# Patient Record
Sex: MALE | Race: WHITE | Employment: FULL TIME | URBAN - METROPOLITAN AREA
[De-identification: names, ages, dates, MRNs, and addresses within clinical notes are randomized per-mention and may not be internally consistent; named-entity substitution may affect disease eponyms.]

---

## 2017-04-09 ENCOUNTER — HOSPITAL ENCOUNTER (OUTPATIENT)
Age: 32
Discharge: HOME | End: 2017-04-09
Attending: EMERGENCY MEDICINE
Payer: COMMERCIAL

## 2017-04-09 VITALS
SYSTOLIC BLOOD PRESSURE: 119 MMHG | BODY MASS INDEX: 36.4 KG/M2 | DIASTOLIC BLOOD PRESSURE: 78 MMHG | OXYGEN SATURATION: 97 % | HEIGHT: 71 IN | WEIGHT: 260 LBS | RESPIRATION RATE: 16 BRPM | HEART RATE: 83 BPM | TEMPERATURE: 98 F

## 2017-04-09 DIAGNOSIS — R23.3 PETECHIAE: Primary | ICD-10-CM

## 2017-04-09 PROCEDURE — 80053 COMPREHEN METABOLIC PANEL: CPT | Performed by: EMERGENCY MEDICINE

## 2017-04-09 PROCEDURE — 85025 COMPLETE CBC W/AUTO DIFF WBC: CPT | Performed by: EMERGENCY MEDICINE

## 2017-04-09 PROCEDURE — 99213 OFFICE O/P EST LOW 20 MIN: CPT

## 2017-04-09 PROCEDURE — 36415 COLL VENOUS BLD VENIPUNCTURE: CPT

## 2017-04-09 PROCEDURE — 80047 BASIC METABLC PNL IONIZED CA: CPT

## 2017-04-09 PROCEDURE — 81002 URINALYSIS NONAUTO W/O SCOPE: CPT | Performed by: EMERGENCY MEDICINE

## 2017-04-09 NOTE — ED NOTES
Patient states he was feeling light headed after blood draw. Patient was layed flat and given ice to apply to forehead. Patient speaking clearly in full sentences. No LOC. Patient given call light and instructed to push if he feels like he is worsening.

## 2017-04-09 NOTE — ED PROVIDER NOTES
Patient presents with:  Rash Skin Problem (integumentary)    HPI:     Darvin Epperson is a 27year old male who presents with chief complaint of rash. Started about 5-6 days ago. Started on the ankles and has spread caudally. No fevers.   No recent viral POCT CBC     MDM:     CMP sent. CBC, istat and UA wnl. Recommend f/u with Derm tomorrow as scheduled. Assessment/Plan:     Diagnosis:  Petechiae    Plan:  1. CMP pending  2.   F/u with derm as scheduled tomorrow    All results reviewed and discuss

## 2017-04-09 NOTE — ED NOTES
Patient will be calling to have someone fax results of CMP to his dermatologist. Patient has an appt tomorrow 4/10/17. Pt provided with all other labs done here today.

## 2017-04-09 NOTE — ED NOTES
Patient given juice and crackers. States he didn't eat much for breakfast. States he is feeling much better.

## 2017-04-09 NOTE — ED INITIAL ASSESSMENT (HPI)
Patient noticed rash to abd and bilateral lower extremities last Wednesday. Saw pcp Thursday and was told it should resolve on its own. Pt states last night he noticed it spread up his legs. Rash does not itch or burn.

## 2017-07-11 ENCOUNTER — TELEPHONE (OUTPATIENT)
Dept: INTERNAL MEDICINE CLINIC | Facility: CLINIC | Age: 32
End: 2017-07-11

## 2017-07-11 NOTE — TELEPHONE ENCOUNTER
Time started: 11:46am    Time ended: 11:47am    Total time spent on chart: 1 min    Patient has a NP appt at the [facility] on 8/2/17 , chart has been reviewed by RN.

## 2017-08-02 ENCOUNTER — OFFICE VISIT (OUTPATIENT)
Dept: INTERNAL MEDICINE CLINIC | Facility: CLINIC | Age: 32
End: 2017-08-02

## 2017-08-02 VITALS
WEIGHT: 272 LBS | RESPIRATION RATE: 16 BRPM | BODY MASS INDEX: 38.94 KG/M2 | SYSTOLIC BLOOD PRESSURE: 120 MMHG | HEART RATE: 80 BPM | HEIGHT: 70 IN | DIASTOLIC BLOOD PRESSURE: 80 MMHG

## 2017-08-02 DIAGNOSIS — F32.A ANXIETY AND DEPRESSION: ICD-10-CM

## 2017-08-02 DIAGNOSIS — F41.9 ANXIETY AND DEPRESSION: ICD-10-CM

## 2017-08-02 DIAGNOSIS — J45.30 MILD PERSISTENT ASTHMA WITHOUT COMPLICATION: ICD-10-CM

## 2017-08-02 DIAGNOSIS — Z51.81 THERAPEUTIC DRUG MONITORING: Primary | ICD-10-CM

## 2017-08-02 PROCEDURE — 93000 ELECTROCARDIOGRAM COMPLETE: CPT | Performed by: NURSE PRACTITIONER

## 2017-08-02 PROCEDURE — 99203 OFFICE O/P NEW LOW 30 MIN: CPT | Performed by: NURSE PRACTITIONER

## 2017-08-02 RX ORDER — PHENTERMINE HYDROCHLORIDE 37.5 MG/1
37.5 TABLET ORAL
Qty: 30 TABLET | Refills: 0 | Status: SHIPPED | OUTPATIENT
Start: 2017-08-02 | End: 2017-11-27

## 2017-08-02 RX ORDER — EPINEPHRINE 0.3 MG/.3ML
INJECTION SUBCUTANEOUS
Refills: 99 | COMMUNITY
Start: 2017-07-07 | End: 2018-12-27

## 2017-08-02 NOTE — PROGRESS NOTES
Cecil Menjivar is a 34year old male new to our office today. Referred by wife, new patient at [facility]. S:  Patient presents today with spouse for c/o weight gain in the past 4-5 years.   Patient sees excess weight as having a mild effect on health and quali icteric, PERRLA  HEENT: atraumatic, normocephalic, O/p: Mallampati score- 3  NECK: supple, non tender, no adenopathy, no thyromegaly  LUNGS: CTA in all fields, breathing non labored  CARDIO: RRR without murmur, normal S1 and S2 without clicks or gallops, n Welcome to the [facility]. ..your Lifestyle Renovation begins now! Thank you for placing your trust in our health care team, I look forward to working with you along this journey to better health!     Next steps:     1.  Schedule a personal another comparable alternative can be done in your home or place of work with little time commitment. Don't forget to schedule your 1 month follow-up visit! Medication use and SEs reviewed with patient.     Return in about 1 month (around 9/2/20

## 2017-08-02 NOTE — PATIENT INSTRUCTIONS
Welcome to the [facility]. ..your Lifestyle Renovation begins now! Thank you for placing your trust in our health care team, I look forward to working with you along this journey to better health!     Next steps:     1.  Schedule a personal n comparable alternative can be done in your home or place of work with little time commitment. Don't forget to schedule your 1 month follow-up visit!

## 2017-08-08 ENCOUNTER — APPOINTMENT (OUTPATIENT)
Dept: LAB | Age: 32
End: 2017-08-08
Attending: NURSE PRACTITIONER
Payer: COMMERCIAL

## 2017-08-08 DIAGNOSIS — F41.9 ANXIETY AND DEPRESSION: ICD-10-CM

## 2017-08-08 DIAGNOSIS — F32.A ANXIETY AND DEPRESSION: ICD-10-CM

## 2017-08-08 LAB
25-HYDROXYVITAMIN D (TOTAL): 27.4 NG/ML (ref 30–100)
EST. AVERAGE GLUCOSE BLD GHB EST-MCNC: 103 MG/DL (ref 68–126)
HAV AB SERPL IA-ACNC: 461 PG/ML (ref 193–986)
HBA1C MFR BLD HPLC: 5.2 % (ref ?–5.7)
TSI SER-ACNC: 0.89 MIU/ML (ref 0.35–5.5)

## 2017-08-08 PROCEDURE — 82607 VITAMIN B-12: CPT

## 2017-08-08 PROCEDURE — 82306 VITAMIN D 25 HYDROXY: CPT

## 2017-08-08 PROCEDURE — 84443 ASSAY THYROID STIM HORMONE: CPT

## 2017-08-08 PROCEDURE — 82397 CHEMILUMINESCENT ASSAY: CPT

## 2017-08-08 PROCEDURE — 36415 COLL VENOUS BLD VENIPUNCTURE: CPT

## 2017-08-08 PROCEDURE — 83036 HEMOGLOBIN GLYCOSYLATED A1C: CPT

## 2017-08-10 LAB — LEPTIN: 20.2 NG/ML

## 2017-08-17 ENCOUNTER — OFFICE VISIT (OUTPATIENT)
Dept: INTERNAL MEDICINE CLINIC | Facility: CLINIC | Age: 32
End: 2017-08-17

## 2017-08-17 DIAGNOSIS — E66.9 OBESITY, CLASS II, BMI 35-39.9: ICD-10-CM

## 2017-08-17 PROCEDURE — 97802 MEDICAL NUTRITION INDIV IN: CPT | Performed by: DIETITIAN, REGISTERED

## 2017-08-20 VITALS — BODY MASS INDEX: 39 KG/M2 | WEIGHT: 270 LBS

## 2017-08-20 NOTE — PROGRESS NOTES
INITIAL OUTPATIENT NUTRITION CONSULTATION    Nutrition Assessment    Medical Diagnosis: Obesity    Client Hx: 34year old male, , FT     Problem List as of 8/17/2017 Reviewed: 8/2/2017  2:27 PM by CLEMENCIA Dela Cruz       Re week    Diet/Weight History: Max weight of 272 pounds at first appt. Not overweight as a child. Wt has increased in the past 5 years since marriage.   In 2010 lost 30 pounds (220-190 pounds) exercising with a  and eating using [employer] busy.  Encouraged eating 3 meals daily and provided sample options. Although pt may have wt. Loss success from reducing portions of foods it is difficult to improve quality of diet due to limited food options and aversion to incorporating wider variety.

## 2017-09-23 ENCOUNTER — OFFICE VISIT (OUTPATIENT)
Dept: INTERNAL MEDICINE CLINIC | Facility: CLINIC | Age: 32
End: 2017-09-23

## 2017-09-23 VITALS
SYSTOLIC BLOOD PRESSURE: 122 MMHG | HEART RATE: 80 BPM | RESPIRATION RATE: 16 BRPM | BODY MASS INDEX: 35.93 KG/M2 | WEIGHT: 251 LBS | DIASTOLIC BLOOD PRESSURE: 72 MMHG | HEIGHT: 70 IN

## 2017-09-23 DIAGNOSIS — F32.A DEPRESSION, CONTROLLED: ICD-10-CM

## 2017-09-23 DIAGNOSIS — Z51.81 ENCOUNTER FOR THERAPEUTIC DRUG MONITORING: Primary | ICD-10-CM

## 2017-09-23 DIAGNOSIS — Z51.81 THERAPEUTIC DRUG MONITORING: ICD-10-CM

## 2017-09-23 DIAGNOSIS — E55.9 VITAMIN D DEFICIENCY: ICD-10-CM

## 2017-09-23 PROCEDURE — 99214 OFFICE O/P EST MOD 30 MIN: CPT | Performed by: NURSE PRACTITIONER

## 2017-09-23 RX ORDER — PHENTERMINE HYDROCHLORIDE 37.5 MG/1
37.5 TABLET ORAL
Qty: 30 TABLET | Refills: 0 | Status: CANCELLED | OUTPATIENT
Start: 2017-09-23

## 2017-09-23 RX ORDER — MOMETASONE FUROATE AND FORMOTEROL FUMARATE DIHYDRATE 200; 5 UG/1; UG/1
2 AEROSOL RESPIRATORY (INHALATION) 2 TIMES DAILY
Refills: 1 | COMMUNITY
Start: 2017-09-12 | End: 2021-02-02

## 2017-09-23 RX ORDER — ERGOCALCIFEROL 1.25 MG/1
50000 CAPSULE ORAL WEEKLY
Qty: 4 CAPSULE | Refills: 5 | Status: SHIPPED | OUTPATIENT
Start: 2017-09-23 | End: 2018-03-01 | Stop reason: ALTCHOICE

## 2017-09-23 NOTE — PATIENT INSTRUCTIONS
Congratulations on your 21# weight loss! Continue making lifestyle changes that focus on good nutrition, regular exercise and stress management.   Today we reviewed your labs with findings of: elevated leptin, low vitamin d    Medication Plan: Start phenter Of course it takes a few weeks before you see any measurable changes. But you’ll start to build muscle, lose fat and burn more calories from the moment you begin weight training. Building muscle helps you:  1. Burn more calories.  Unlike fat, muscle beefs u

## 2017-09-23 NOTE — PROGRESS NOTES
Kameron Morrow is a 34year old male presents today for 2 month follow-up on medical weight loss program for the treatment of overweight, obesity, or morbid obesity with associated elevated leptin, low vitamin D.    S:  Current weight Wt Readings from Last PSYCH: pleasant, cooperative, normal mood and affect    ASSESSMENT AND PLAN:  Encounter for therapeutic drug monitoring  (primary encounter diagnosis)  Therapeutic drug monitoring  Bmi 39.0-39.9,adult  Vitamin d deficiency  Depression, controlled    No ord Maybe Sanjiv Shaikh wondered about muscle vs fat and why you need to build muscle to lose fat, look slim and keep the inches off. Well, look no further!  With the fat vs muscle diagram below you’ll see why healthy permanent weight loss requires you to build muscl 4. Prevent injury. Strength training can build stronger muscles and more limber, flexible joints, which play a crucial role in preventing injury. 5. Increase bone density. Weight bearing activities improve your bone density and reduce bone loss.  This help

## 2017-11-01 ENCOUNTER — OFFICE VISIT (OUTPATIENT)
Dept: INTERNAL MEDICINE CLINIC | Facility: CLINIC | Age: 32
End: 2017-11-01

## 2017-11-01 VITALS
RESPIRATION RATE: 16 BRPM | BODY MASS INDEX: 34.36 KG/M2 | HEART RATE: 78 BPM | SYSTOLIC BLOOD PRESSURE: 110 MMHG | DIASTOLIC BLOOD PRESSURE: 70 MMHG | HEIGHT: 70 IN | WEIGHT: 240 LBS

## 2017-11-01 DIAGNOSIS — E66.01 SEVERE OBESITY (BMI 35.0-39.9): ICD-10-CM

## 2017-11-01 DIAGNOSIS — Z51.81 THERAPEUTIC DRUG MONITORING: Primary | ICD-10-CM

## 2017-11-01 PROCEDURE — 99213 OFFICE O/P EST LOW 20 MIN: CPT | Performed by: NURSE PRACTITIONER

## 2017-11-01 RX ORDER — PHENTERMINE HYDROCHLORIDE 37.5 MG/1
37.5 TABLET ORAL
Qty: 30 TABLET | Refills: 0 | Status: CANCELLED | OUTPATIENT
Start: 2017-11-01

## 2017-11-01 NOTE — PROGRESS NOTES
Gosia Erickson is a 34year old male presents today for 3 month follow-up on medical weight loss program for the treatment of overweight, obesity, or morbid obesity with associated elevated leptin, low vitamin D.    S:  Current weight Wt Readings from Last Visit:  No prescriptions requested or ordered in this encounter    Imaging & Consults:  None      Plan:  Patient has lost 11# since LOV 1 month ago on continued lifestyle with a total weight loss of 32# since initial consult on 8/2/2017 with initial weight Scale Work? Before you eat, take a moment to rate your hunger. Think about how hungry you physically feel. Your goal is to eat between levels 4 and 6. This means you are eating when you are hungry but stopping when you are comfortably full.   Try not to pu verbalizes understanding.

## 2017-11-01 NOTE — PATIENT INSTRUCTIONS
Congratulations on your 11# weight loss! Continue making lifestyle changes that focus on good nutrition, regular exercise and stress management.     Medication Plan: Recommend starting 1/2 tab of phentermine daily in AM should evening cravings persist.    A to an emotion or because you are experiencing physical hunger? Think of alternatives to eating for when these temptations arise.  Some ideas are:  Drink a glass of cold water or another zero-calorie beverage   Take a walk to change the scenery   Do another

## 2017-11-29 ENCOUNTER — OFFICE VISIT (OUTPATIENT)
Dept: INTERNAL MEDICINE CLINIC | Facility: CLINIC | Age: 32
End: 2017-11-29

## 2017-11-29 VITALS
HEART RATE: 80 BPM | DIASTOLIC BLOOD PRESSURE: 68 MMHG | BODY MASS INDEX: 33.64 KG/M2 | HEIGHT: 70 IN | RESPIRATION RATE: 16 BRPM | SYSTOLIC BLOOD PRESSURE: 106 MMHG | WEIGHT: 235 LBS

## 2017-11-29 DIAGNOSIS — F32.A DEPRESSION, UNSPECIFIED DEPRESSION TYPE: ICD-10-CM

## 2017-11-29 DIAGNOSIS — Z51.81 THERAPEUTIC DRUG MONITORING: Primary | ICD-10-CM

## 2017-11-29 PROCEDURE — 99213 OFFICE O/P EST LOW 20 MIN: CPT | Performed by: NURSE PRACTITIONER

## 2017-11-29 RX ORDER — BUPROPION HYDROCHLORIDE 150 MG/1
150 TABLET ORAL DAILY
Qty: 30 TABLET | Refills: 1 | Status: SHIPPED | OUTPATIENT
Start: 2017-11-29 | End: 2018-01-02

## 2017-11-29 RX ORDER — PHENTERMINE HYDROCHLORIDE 37.5 MG/1
37.5 TABLET ORAL
Qty: 30 TABLET | Refills: 0 | Status: SHIPPED | OUTPATIENT
Start: 2017-11-29 | End: 2018-01-10

## 2017-11-29 RX ORDER — PHENTERMINE HYDROCHLORIDE 37.5 MG/1
37.5 TABLET ORAL
COMMUNITY
End: 2017-12-03

## 2017-11-29 NOTE — PROGRESS NOTES
Bárbara Malone is a 34year old male presents today for 4 month follow-up on medical weight loss program for the treatment of overweight, obesity, or morbid obesity with associated elevated leptin, low vitamin D.    S:  Current weight Wt Readings from Last PLAN:  Therapeutic drug monitoring  (primary encounter diagnosis)  Bmi 39.0-39.9,adult  Depression, unspecified depression type    No orders of the defined types were placed in this encounter.       Meds & Refills for this Visit:    Signed Prescriptions Dis

## 2017-11-29 NOTE — PATIENT INSTRUCTIONS
Congratulations on your 5# weight loss! Continue making lifestyle changes that focus on good nutrition, regular exercise and stress management. Medication Plan: Continue phentermine at current dose.  Change Wellbutrin to Wellbutrin  daily in AM.

## 2017-12-30 ENCOUNTER — PATIENT MESSAGE (OUTPATIENT)
Dept: INTERNAL MEDICINE CLINIC | Facility: CLINIC | Age: 32
End: 2017-12-30

## 2017-12-30 DIAGNOSIS — Z51.81 THERAPEUTIC DRUG MONITORING: ICD-10-CM

## 2017-12-30 DIAGNOSIS — F32.A DEPRESSION, UNSPECIFIED DEPRESSION TYPE: ICD-10-CM

## 2018-01-02 RX ORDER — BUPROPION HYDROCHLORIDE 150 MG/1
300 TABLET ORAL DAILY
Qty: 60 TABLET | Refills: 1 | Status: SHIPPED | OUTPATIENT
Start: 2018-01-02 | End: 2018-02-15

## 2018-01-02 NOTE — TELEPHONE ENCOUNTER
First confirm no suicidal ideation, if no then please notify patient that I increased the dose to requested dose of Wellbutrin  mg tab, si tabs (300 mg) daily.  If mood does not improve over the next week or worsening he needs to see his PCP/couns

## 2018-01-02 NOTE — TELEPHONE ENCOUNTER
From: Caryn Wang  To: CLEMENCIA Trinh  Sent: 12/30/2017 6:43 PM CST  Subject: Prescription Question    Dennis Marion,  I was doing pretty well on the 150mg Welbutrin XL but have been in a depression for the last 2 days now.  I'm wondering if I can i

## 2018-01-02 NOTE — TELEPHONE ENCOUNTER
Called patient to schedule an appointment for his December appointment as last ov was 11/29. Patient was scheduled for 1/10. I spoke with him about the  Message Gabe Yun put in regarding his Wellbutrin. He is not having any suicidal ideation.  He states he

## 2018-01-10 ENCOUNTER — OFFICE VISIT (OUTPATIENT)
Dept: INTERNAL MEDICINE CLINIC | Facility: CLINIC | Age: 33
End: 2018-01-10

## 2018-01-10 VITALS
HEART RATE: 68 BPM | RESPIRATION RATE: 16 BRPM | SYSTOLIC BLOOD PRESSURE: 110 MMHG | WEIGHT: 232 LBS | HEIGHT: 70 IN | BODY MASS INDEX: 33.21 KG/M2 | DIASTOLIC BLOOD PRESSURE: 70 MMHG

## 2018-01-10 DIAGNOSIS — F32.A DEPRESSION, CONTROLLED: ICD-10-CM

## 2018-01-10 DIAGNOSIS — E66.9 CLASS 2 OBESITY WITH BODY MASS INDEX (BMI) OF 38.0 TO 38.9 IN ADULT, UNSPECIFIED OBESITY TYPE, UNSPECIFIED WHETHER SERIOUS COMORBIDITY PRESENT: ICD-10-CM

## 2018-01-10 DIAGNOSIS — Z51.81 THERAPEUTIC DRUG MONITORING: Primary | ICD-10-CM

## 2018-01-10 PROCEDURE — 99213 OFFICE O/P EST LOW 20 MIN: CPT | Performed by: NURSE PRACTITIONER

## 2018-01-10 RX ORDER — PHENTERMINE HYDROCHLORIDE 37.5 MG/1
37.5 TABLET ORAL
Qty: 30 TABLET | Refills: 0 | Status: SHIPPED | OUTPATIENT
Start: 2018-01-10 | End: 2018-02-15 | Stop reason: DRUGHIGH

## 2018-01-10 NOTE — PATIENT INSTRUCTIONS
Congratulations on your 3# weight loss! Continue making lifestyle changes that focus on good nutrition, regular exercise and stress management. Medication Plan: Continue phentermine at current dose. Increase Wellbutrin XL to 300 mg daily.     Agreed upon you don’t meet a goal, don’t use it as an excuse to give up on your whole plan. Adjust your goal and try again. · Try to understand your own attitude about food.  Are you subject to emotional eating? · Learn how to accept compliments.  Even if you get emb

## 2018-01-10 NOTE — PROGRESS NOTES
Caryn Wang is a 34year old male presents today for 5 month follow-up on medical weight loss program for the treatment of overweight, obesity, or morbid obesity with associated elevated leptin, low vitamin D.    S:  Current weight Wt Readings from Last with body mass index (bmi) of 38.0 to 38.9 in adult, unspecified obesity type, unspecified whether serious comorbidity present  Depression, controlled    No orders of the defined types were placed in this encounter.       Meds & Refills for this Visit:    S time of day. Weighing yourself each day will probably only frustrate you.    Stay motivated  Here are suggestions to keep you motivated:   · Remind yourself of your goals. Post them near the refrigerator or desk where you work.   · Make daily entries in you not intended as a substitute for professional medical care. Always follow your healthcare professional's instructions. Medication use and SEs reviewed with patient. Return in about 1 month (around 2/10/2018) for weight mangement.     Patient Katia Carey

## 2018-01-21 DIAGNOSIS — Z51.81 THERAPEUTIC DRUG MONITORING: ICD-10-CM

## 2018-01-21 DIAGNOSIS — F32.A DEPRESSION, UNSPECIFIED DEPRESSION TYPE: ICD-10-CM

## 2018-01-22 RX ORDER — BUPROPION HYDROCHLORIDE 150 MG/1
150 TABLET ORAL DAILY
Qty: 30 TABLET | Refills: 1 | OUTPATIENT
Start: 2018-01-22

## 2018-01-22 NOTE — TELEPHONE ENCOUNTER
[facility] requesting: bupropion ER XL 150mg 1 QD  LOV: 1/10/18 [facility]  Continue phentermine at current dose. Increase Wellbutrin XL to 300 mg daily as previously prescribed. RTC: 1 month  Last Relevant Labs: n/a  Filled: 1/2/18 #60 with 1 refills.   Take 2

## 2018-02-15 ENCOUNTER — OFFICE VISIT (OUTPATIENT)
Dept: INTERNAL MEDICINE CLINIC | Facility: CLINIC | Age: 33
End: 2018-02-15

## 2018-02-15 VITALS
HEIGHT: 70 IN | DIASTOLIC BLOOD PRESSURE: 88 MMHG | BODY MASS INDEX: 32.78 KG/M2 | SYSTOLIC BLOOD PRESSURE: 120 MMHG | HEART RATE: 80 BPM | WEIGHT: 229 LBS

## 2018-02-15 DIAGNOSIS — Z51.81 THERAPEUTIC DRUG MONITORING: Primary | ICD-10-CM

## 2018-02-15 DIAGNOSIS — F32.A DEPRESSION, UNSPECIFIED DEPRESSION TYPE: ICD-10-CM

## 2018-02-15 DIAGNOSIS — E66.9 CLASS 2 OBESITY WITH BODY MASS INDEX (BMI) OF 38.0 TO 38.9 IN ADULT, UNSPECIFIED OBESITY TYPE, UNSPECIFIED WHETHER SERIOUS COMORBIDITY PRESENT: ICD-10-CM

## 2018-02-15 PROCEDURE — 99213 OFFICE O/P EST LOW 20 MIN: CPT | Performed by: NURSE PRACTITIONER

## 2018-02-15 RX ORDER — BUPROPION HYDROCHLORIDE 150 MG/1
300 TABLET ORAL DAILY
Qty: 60 TABLET | Refills: 5 | Status: SHIPPED | OUTPATIENT
Start: 2018-02-15 | End: 2018-07-17

## 2018-02-15 RX ORDER — PHENTERMINE HYDROCHLORIDE 15 MG/1
15 CAPSULE ORAL EVERY MORNING
Qty: 30 CAPSULE | Refills: 1 | Status: SHIPPED | OUTPATIENT
Start: 2018-02-15 | End: 2018-05-09

## 2018-02-15 RX ORDER — PHENTERMINE HYDROCHLORIDE 37.5 MG/1
37.5 TABLET ORAL
Qty: 30 TABLET | Refills: 0 | Status: CANCELLED | OUTPATIENT
Start: 2018-02-15

## 2018-02-15 RX ORDER — METFORMIN HYDROCHLORIDE 500 MG/1
500 TABLET, EXTENDED RELEASE ORAL
Qty: 30 TABLET | Refills: 1 | Status: SHIPPED | OUTPATIENT
Start: 2018-02-15 | End: 2018-06-05

## 2018-02-15 NOTE — PROGRESS NOTES
Aileen Ashley is a 34year old male presents today for 6 month follow-up on medical weight loss program for the treatment of overweight, obesity, or morbid obesity with associated elevated leptin, low vitamin D.    S:  Current weight Wt Readings from Last 38.9 in adult, unspecified obesity type, unspecified whether serious comorbidity present  Depression, unspecified depression type    No orders of the defined types were placed in this encounter.       Meds & Refills for this Visit:    Signed Prescriptions D

## 2018-02-15 NOTE — PATIENT INSTRUCTIONS
Congratulations on your 3# weight loss! Continue making lifestyle changes that focus on good nutrition, regular exercise and stress management. Medication Plan: Reduce phentermine to 15 mg daily, continue Wellbutrin XL at current dose.  Add Metformin ER

## 2018-03-02 DIAGNOSIS — F32.A DEPRESSION, UNSPECIFIED DEPRESSION TYPE: ICD-10-CM

## 2018-03-02 DIAGNOSIS — Z51.81 THERAPEUTIC DRUG MONITORING: ICD-10-CM

## 2018-03-02 DIAGNOSIS — E66.9 CLASS 2 OBESITY WITH BODY MASS INDEX (BMI) OF 38.0 TO 38.9 IN ADULT, UNSPECIFIED OBESITY TYPE, UNSPECIFIED WHETHER SERIOUS COMORBIDITY PRESENT: ICD-10-CM

## 2018-03-03 RX ORDER — BUPROPION HYDROCHLORIDE 150 MG/1
TABLET ORAL
Qty: 180 TABLET | Refills: 1 | OUTPATIENT
Start: 2018-03-03

## 2018-03-07 ENCOUNTER — OFFICE VISIT (OUTPATIENT)
Dept: INTERNAL MEDICINE CLINIC | Facility: CLINIC | Age: 33
End: 2018-03-07

## 2018-03-07 VITALS
RESPIRATION RATE: 16 BRPM | DIASTOLIC BLOOD PRESSURE: 70 MMHG | HEIGHT: 70 IN | HEART RATE: 80 BPM | WEIGHT: 227 LBS | BODY MASS INDEX: 32.5 KG/M2 | SYSTOLIC BLOOD PRESSURE: 100 MMHG

## 2018-03-07 DIAGNOSIS — Z51.81 THERAPEUTIC DRUG MONITORING: Primary | ICD-10-CM

## 2018-03-07 DIAGNOSIS — E66.9 CLASS 2 OBESITY WITH BODY MASS INDEX (BMI) OF 38.0 TO 38.9 IN ADULT, UNSPECIFIED OBESITY TYPE, UNSPECIFIED WHETHER SERIOUS COMORBIDITY PRESENT: ICD-10-CM

## 2018-03-07 PROCEDURE — 99213 OFFICE O/P EST LOW 20 MIN: CPT | Performed by: NURSE PRACTITIONER

## 2018-03-07 RX ORDER — METFORMIN HYDROCHLORIDE 500 MG/1
500 TABLET, EXTENDED RELEASE ORAL
Qty: 30 TABLET | Refills: 1 | Status: CANCELLED | OUTPATIENT
Start: 2018-03-07

## 2018-03-07 NOTE — PROGRESS NOTES
Narendra Swanson is a 34year old male presents today with spouse for 7 month follow-up on medical weight loss program for the treatment of overweight, obesity, or morbid obesity with associated elevated leptin, low vitamin D.    S:  Current weight Wt Readin Class 2 obesity with body mass index (bmi) of 38.0 to 38.9 in adult, unspecified obesity type, unspecified whether serious comorbidity present    No orders of the defined types were placed in this encounter.       Meds & Refills for this Visit:    No prescr Weight Management: Overcoming Your Barriers    You may have many reasons why you’re not ready to lose weight. You may not feel you have the time or the skills. You may be afraid of losing weight and gaining it back again. Well, you can lose weight.  And you Do you have a health problem? If so, don’t use it as an excuse for not losing weight. Ask your healthcare provider or dietitian about methods to lose weight that are safe for you.  For example, even if you have severe arthritis, it may be easier for you to

## 2018-03-07 NOTE — PATIENT INSTRUCTIONS
Congratulations on your 2# weight loss! Continue making lifestyle changes that focus on good nutrition, regular exercise and stress management.     Medication Plan: Continue Wellbutrin XL, okay to take phentermine around noon- if experience difficulty sleep schedule. · Borrow some time that you usually spend watching TV.   · You are too important not to take time to exercise—it is your life!   Feel good about yourself  Do you eat more because you feel bad about yourself, then feel even worse as you gain weigh

## 2018-05-09 ENCOUNTER — OFFICE VISIT (OUTPATIENT)
Dept: INTERNAL MEDICINE CLINIC | Facility: CLINIC | Age: 33
End: 2018-05-09

## 2018-05-09 VITALS
WEIGHT: 230 LBS | DIASTOLIC BLOOD PRESSURE: 60 MMHG | SYSTOLIC BLOOD PRESSURE: 110 MMHG | HEIGHT: 70 IN | OXYGEN SATURATION: 99 % | HEART RATE: 78 BPM | BODY MASS INDEX: 32.93 KG/M2 | RESPIRATION RATE: 16 BRPM

## 2018-05-09 DIAGNOSIS — Z51.81 THERAPEUTIC DRUG MONITORING: Primary | ICD-10-CM

## 2018-05-09 DIAGNOSIS — F32.A DEPRESSION, CONTROLLED: ICD-10-CM

## 2018-05-09 DIAGNOSIS — E66.01 SEVERE OBESITY (BMI 35.0-39.9): ICD-10-CM

## 2018-05-09 PROCEDURE — 99213 OFFICE O/P EST LOW 20 MIN: CPT | Performed by: NURSE PRACTITIONER

## 2018-05-09 RX ORDER — PHENTERMINE HYDROCHLORIDE 37.5 MG/1
37.5 CAPSULE ORAL EVERY MORNING
Qty: 30 CAPSULE | Refills: 0 | Status: SHIPPED | OUTPATIENT
Start: 2018-05-09 | End: 2018-06-05

## 2018-05-09 NOTE — PATIENT INSTRUCTIONS
Continue making lifestyle changes that focus on good nutrition, regular exercise and stress management.     Medication Plan: Continue current medication regimen, restart phentermine at 1/2 tab daily in AM for 1 week, then increase to a full tab daily as pre

## 2018-05-09 NOTE — PROGRESS NOTES
Caryn Wang is a 34year old male presents today with spouse for 9 month follow-up on medical weight loss program for the treatment of overweight, obesity, or morbid obesity with associated elevated leptin, low vitamin D.    S:  Current weight Wt Readin PLAN:  Therapeutic drug monitoring  (primary encounter diagnosis)  Severe obesity (bmi 35.0-39.9) (hcc)  Depression, controlled    No orders of the defined types were placed in this encounter.       Meds & Refills for this Visit:    Signed Prescriptions Dis with patient. Return in about 4 weeks (around 6/6/2018) for weight management. Patient verbalizes understanding.

## 2018-06-04 RX ORDER — PHENTERMINE HYDROCHLORIDE 37.5 MG/1
TABLET ORAL
Qty: 30 TABLET | Refills: 0 | OUTPATIENT
Start: 2018-06-04

## 2018-06-04 NOTE — TELEPHONE ENCOUNTER
Requesting Phentermine HCl 37.5 MG Oral Cap  LOV: 5/9/18  RTC: 1 mth   Last Relevant Labs:   Filled: 5/9/18 #30 with 0 refills    Future Appointments  Date Time Provider Michael Salazar   6/5/2018 2:40 PM CLEMENCIA Gonzalez [facility]     rx

## 2018-06-05 ENCOUNTER — OFFICE VISIT (OUTPATIENT)
Dept: INTERNAL MEDICINE CLINIC | Facility: CLINIC | Age: 33
End: 2018-06-05

## 2018-06-05 VITALS
RESPIRATION RATE: 16 BRPM | HEART RATE: 78 BPM | HEIGHT: 70 IN | WEIGHT: 231 LBS | DIASTOLIC BLOOD PRESSURE: 78 MMHG | SYSTOLIC BLOOD PRESSURE: 128 MMHG | BODY MASS INDEX: 33.07 KG/M2

## 2018-06-05 DIAGNOSIS — F32.A DEPRESSION, CONTROLLED: ICD-10-CM

## 2018-06-05 DIAGNOSIS — Z51.81 THERAPEUTIC DRUG MONITORING: Primary | ICD-10-CM

## 2018-06-05 DIAGNOSIS — E66.01 SEVERE OBESITY (BMI 35.0-39.9): ICD-10-CM

## 2018-06-05 PROCEDURE — 99213 OFFICE O/P EST LOW 20 MIN: CPT | Performed by: NURSE PRACTITIONER

## 2018-06-05 RX ORDER — METFORMIN HYDROCHLORIDE 500 MG/1
500 TABLET, EXTENDED RELEASE ORAL
Qty: 30 TABLET | Refills: 1 | Status: SHIPPED | OUTPATIENT
Start: 2018-06-05 | End: 2018-12-27

## 2018-06-05 RX ORDER — PHENTERMINE HYDROCHLORIDE 37.5 MG/1
37.5 CAPSULE ORAL EVERY MORNING
Qty: 30 CAPSULE | Refills: 0 | Status: SHIPPED | OUTPATIENT
Start: 2018-06-05 | End: 2018-09-06

## 2018-06-05 NOTE — PATIENT INSTRUCTIONS
Continue making lifestyle changes that focus on good nutrition, regular exercise and stress management.     Medication Plan: Restart phentermine: Start phentermine at 1/2 tab daily in AM before breakfast for 1 week, then increase to a full tab daily as pres eating. 1. Make a commitment. Like any established bad habit, nothing will change unless you make a commit to changing your behavior. 2. Practice awareness.  To be more conscious of what’s happening, jot down when and what you eat and how you feel before new health-oriented friends or join a support group. Learning how to stop emotional eating and overeating is a life-changing experience. Just make sure to stay on track and enjoy the journey.     Posted By Winter Belcher On December 27, 2015 @ 11:33 am In [place]

## 2018-06-05 NOTE — PROGRESS NOTES
Augustina Mcginnis is a 34year old male presents today with spouse for 10 month follow-up on medical weight loss program for the treatment of overweight, obesity, or morbid obesity with associated elevated leptin, low vitamin D.    S:  Current weight Wt Readi edema.  NEURO/MS: motor and sensory grossly intact  PSYCH: pleasant, cooperative, normal mood and affect    ASSESSMENT AND PLAN:  Therapeutic drug monitoring  (primary encounter diagnosis)  Severe obesity (bmi 35.0-39.9) (hcc)  Depression, controlled    No eliminating processed items.   5. Listen to CALM antoni Masterclass: 4 Pillars of Health and Rethinking Depression    Emotional Eating and Overeating   You have to know how to stop emotional eating and stop overeating if you want to lose weight and keep it off mood enhancer too. So be sure you make time for regular physical activity. 5. Create new comforts. Make a list of healthy activities you enjoy. And, whenever you feel the need, treat yourself to something on your list.  6. Start eating healthier.  When you

## 2018-07-17 ENCOUNTER — OFFICE VISIT (OUTPATIENT)
Dept: INTERNAL MEDICINE CLINIC | Facility: CLINIC | Age: 33
End: 2018-07-17
Payer: COMMERCIAL

## 2018-07-17 VITALS
HEIGHT: 70 IN | DIASTOLIC BLOOD PRESSURE: 80 MMHG | HEART RATE: 66 BPM | SYSTOLIC BLOOD PRESSURE: 100 MMHG | BODY MASS INDEX: 33.21 KG/M2 | RESPIRATION RATE: 16 BRPM | WEIGHT: 232 LBS

## 2018-07-17 DIAGNOSIS — E66.9 CLASS 2 OBESITY WITH BODY MASS INDEX (BMI) OF 38.0 TO 38.9 IN ADULT, UNSPECIFIED OBESITY TYPE, UNSPECIFIED WHETHER SERIOUS COMORBIDITY PRESENT: ICD-10-CM

## 2018-07-17 DIAGNOSIS — F32.A DEPRESSION, UNSPECIFIED DEPRESSION TYPE: ICD-10-CM

## 2018-07-17 DIAGNOSIS — Z51.81 THERAPEUTIC DRUG MONITORING: Primary | ICD-10-CM

## 2018-07-17 PROCEDURE — 99213 OFFICE O/P EST LOW 20 MIN: CPT | Performed by: NURSE PRACTITIONER

## 2018-07-17 RX ORDER — PHENTERMINE HYDROCHLORIDE 37.5 MG/1
37.5 CAPSULE ORAL EVERY MORNING
Qty: 30 CAPSULE | Refills: 0 | Status: CANCELLED | OUTPATIENT
Start: 2018-07-17

## 2018-07-17 RX ORDER — METFORMIN HYDROCHLORIDE 500 MG/1
500 TABLET, EXTENDED RELEASE ORAL
Qty: 30 TABLET | Refills: 1 | Status: CANCELLED | OUTPATIENT
Start: 2018-07-17

## 2018-07-17 RX ORDER — BUPROPION HYDROCHLORIDE 150 MG/1
300 TABLET ORAL DAILY
Qty: 60 TABLET | Refills: 5 | Status: SHIPPED | OUTPATIENT
Start: 2018-07-17 | End: 2018-12-27

## 2018-07-17 NOTE — PROGRESS NOTES
Augustina Mcginnis is a 34year old male presents today for 11 month follow-up on medical weight loss program for the treatment of overweight, obesity, or morbid obesity with associated elevated leptin, low vitamin D.    S:  Current weight Wt Readings from [facility] monitoring  (primary encounter diagnosis)  Depression, unspecified depression type  Class 2 obesity with body mass index (bmi) of 38.0 to 38.9 in adult, unspecified obesity type, unspecified whether serious comorbidity present    No orders of the defined t Weight Loss    1. Practice Mindful Eating and listen to your Hunger Cues. 2. Make time for self care daily, recommend practicing Meditation.   3. Incorporate regular exercise with a balance of cardio and strength most days of the week, making sure there to that works for KAYKAY 3: I don’t have the time to be active. Barrier Buster: It takes just a few minutes a day! · Be active with a pet or the kids. · Block off activity time in your schedule.   · Borrow some time that you usually spend watching T

## 2018-07-17 NOTE — PATIENT INSTRUCTIONS
Continue making lifestyle changes that focus on good nutrition, regular exercise and stress management. Medication Plan: Continue current medication regimen.  Restart phentermine: Start phentermine at 1/2 tab daily in AM before breakfast for 1 week, then doggy bag when you eat out. · Have just one. · Choose lower-fat and lower-calorie versions of your favorites. · Use a small plate instead of a normal-sized plate.   Barrier 2: I lost weight before but I gained it right back.   Barrier Buster: Make this t

## 2018-08-03 DIAGNOSIS — E66.01 SEVERE OBESITY WITH BODY MASS INDEX (BMI) OF 35.0 TO 39.9 WITH SERIOUS COMORBIDITY (HCC): ICD-10-CM

## 2018-08-03 DIAGNOSIS — Z51.81 THERAPEUTIC DRUG MONITORING: ICD-10-CM

## 2018-08-06 RX ORDER — PHENTERMINE HYDROCHLORIDE 37.5 MG/1
CAPSULE ORAL
Qty: 30 CAPSULE | Refills: 0 | OUTPATIENT
Start: 2018-08-06

## 2018-08-06 NOTE — TELEPHONE ENCOUNTER
REQUESTING: phentermine 37.5mg  LOV: 7/17/18 [facility]  CPM, resume phentermine and discussed regularity with Metformin ER.   RTC: 2 months - 9/2018  LAST LABS: n/a  LAST REFILL: 6/5/18 # 30 WITH 0 REFILLS    [place] 6[place]8/18 [place]30    No future antoni

## 2018-08-07 RX ORDER — PHENTERMINE HYDROCHLORIDE 37.5 MG/1
37.5 TABLET ORAL
Qty: 30 TABLET | Refills: 0 | OUTPATIENT
Start: 2018-08-07 | End: 2018-09-06

## 2018-08-07 NOTE — TELEPHONE ENCOUNTER
Phentermine 37.5mg #30 called into [facility] on file. No future appointments. Siamosoci message sent to patient informing that he needs to schedule a follow up.

## 2018-09-03 DIAGNOSIS — Z51.81 THERAPEUTIC DRUG MONITORING: ICD-10-CM

## 2018-09-03 DIAGNOSIS — E66.01 SEVERE OBESITY WITH BODY MASS INDEX (BMI) OF 35.0 TO 39.9 WITH SERIOUS COMORBIDITY (HCC): ICD-10-CM

## 2018-09-04 RX ORDER — METFORMIN HYDROCHLORIDE 500 MG/1
TABLET, EXTENDED RELEASE ORAL
Qty: 30 TABLET | Refills: 0 | OUTPATIENT
Start: 2018-09-04

## 2018-09-04 NOTE — TELEPHONE ENCOUNTER
Requesting    Pending Prescriptions Disp Refills    METFORMIN HCL  MG Oral Tablet 24 Hr [Pharmacy Med Name: METFORMIN ER 500MG 24HR TABS] 30 tablet 0     Sig: TAKE 1 TABLET BY MOUTH DAILY WITH FOOD         LOV: 7/17/18  RTC: 2 months  Filled:6/5/18  #30 with 1 refills    Future Appointments  Date Time Provider Michael Salazar   9/6/2018 4:00 PM CLEMENCIA Branham [facility]

## 2018-09-06 ENCOUNTER — OFFICE VISIT (OUTPATIENT)
Dept: INTERNAL MEDICINE CLINIC | Facility: CLINIC | Age: 33
End: 2018-09-06
Payer: COMMERCIAL

## 2018-09-06 VITALS
BODY MASS INDEX: 31.78 KG/M2 | OXYGEN SATURATION: 98 % | SYSTOLIC BLOOD PRESSURE: 116 MMHG | DIASTOLIC BLOOD PRESSURE: 78 MMHG | HEIGHT: 70 IN | HEART RATE: 98 BPM | WEIGHT: 222 LBS

## 2018-09-06 DIAGNOSIS — E66.01 SEVERE OBESITY WITH BODY MASS INDEX (BMI) OF 35.0 TO 39.9 WITH SERIOUS COMORBIDITY (HCC): ICD-10-CM

## 2018-09-06 DIAGNOSIS — Z51.81 THERAPEUTIC DRUG MONITORING: Primary | ICD-10-CM

## 2018-09-06 PROCEDURE — 99213 OFFICE O/P EST LOW 20 MIN: CPT | Performed by: NURSE PRACTITIONER

## 2018-09-06 RX ORDER — PHENTERMINE HYDROCHLORIDE 37.5 MG/1
37.5 TABLET ORAL
Qty: 30 TABLET | Refills: 0 | Status: SHIPPED | OUTPATIENT
Start: 2018-09-06 | End: 2018-11-10

## 2018-09-06 NOTE — PROGRESS NOTES
Nena Robbins is a 28year old male presents today for >1 year follow-up on medical weight loss program for the treatment of overweight, obesity, or morbid obesity with associated elevated leptin, low vitamin D.    S:  Current weight Wt Readings from Last icteric  LUNGS: CTA in all fields, breathing non labored  CARDIO: RRR without murmur, normal S1 and S2 without clicks or gallops, no pedal edema.   NEURO/MS: motor and sensory grossly intact  PSYCH: pleasant, cooperative, normal mood and affect    ASSESSMEN all along the way and to have realistic and achievable goals. There are things you can do to keep yourself on the path to success. Don’t focus on daily weight gains and losses. Instead, weigh yourself no more than once a week at the same time of day.  Candace Kothari Nutritionwww. fitness. gov  · Academy of Nutrition and Dieteticswww. eatright. org  · Danyel.co.uk. gov  Date Last Reviewed: 2/4/2016  © 6488-3366 [address], [address] [place] [address]. All rights reserved.  This in

## 2018-09-06 NOTE — PATIENT INSTRUCTIONS
Continue making lifestyle changes that focus on good nutrition, regular exercise and stress management. Medication Plan: Continue current medication regimen. Agreed upon goal/s before next office visit include: Patricia Caro work!  Continue to establish fitne excuse to give up on your whole plan. Adjust your goal and try again. · Try to understand your own attitude about food.  Are you subject to emotional eating? · Learn how to accept compliments.  Even if you get embarrassed, just say “thank you.”  · Make a

## 2018-11-10 DIAGNOSIS — Z51.81 THERAPEUTIC DRUG MONITORING: Primary | ICD-10-CM

## 2018-11-10 DIAGNOSIS — E66.01 SEVERE OBESITY WITH BODY MASS INDEX (BMI) OF 35.0 TO 39.9 WITH SERIOUS COMORBIDITY (HCC): ICD-10-CM

## 2018-11-10 RX ORDER — PHENTERMINE HYDROCHLORIDE 37.5 MG/1
37.5 TABLET ORAL
Qty: 30 TABLET | Refills: 0 | Status: SHIPPED | OUTPATIENT
Start: 2018-11-10 | End: 2018-12-27

## 2018-11-10 NOTE — TELEPHONE ENCOUNTER
Pts wife came in stating pt spilled coffee all over his rx given to him last ov and wants a new one.  rx pended per delgado

## 2018-12-27 ENCOUNTER — OFFICE VISIT (OUTPATIENT)
Dept: INTERNAL MEDICINE CLINIC | Facility: CLINIC | Age: 33
End: 2018-12-27
Payer: COMMERCIAL

## 2018-12-27 VITALS
RESPIRATION RATE: 14 BRPM | HEIGHT: 70 IN | WEIGHT: 231 LBS | SYSTOLIC BLOOD PRESSURE: 110 MMHG | DIASTOLIC BLOOD PRESSURE: 70 MMHG | BODY MASS INDEX: 33.07 KG/M2 | HEART RATE: 100 BPM

## 2018-12-27 DIAGNOSIS — E66.01 SEVERE OBESITY WITH BODY MASS INDEX (BMI) OF 35.0 TO 39.9 WITH SERIOUS COMORBIDITY (HCC): ICD-10-CM

## 2018-12-27 DIAGNOSIS — Z51.81 THERAPEUTIC DRUG MONITORING: Primary | ICD-10-CM

## 2018-12-27 DIAGNOSIS — F32.A DEPRESSION, UNSPECIFIED DEPRESSION TYPE: ICD-10-CM

## 2018-12-27 DIAGNOSIS — Z51.81 THERAPEUTIC DRUG MONITORING: ICD-10-CM

## 2018-12-27 PROCEDURE — 99213 OFFICE O/P EST LOW 20 MIN: CPT | Performed by: NURSE PRACTITIONER

## 2018-12-27 RX ORDER — METFORMIN HYDROCHLORIDE 500 MG/1
500 TABLET, EXTENDED RELEASE ORAL
Qty: 30 TABLET | Refills: 1 | Status: SHIPPED | OUTPATIENT
Start: 2018-12-27 | End: 2019-02-18

## 2018-12-27 RX ORDER — BUPROPION HYDROCHLORIDE 150 MG/1
300 TABLET ORAL DAILY
Qty: 60 TABLET | Refills: 5 | Status: SHIPPED | OUTPATIENT
Start: 2018-12-27 | End: 2019-09-18

## 2018-12-27 RX ORDER — PHENTERMINE HYDROCHLORIDE 37.5 MG/1
37.5 TABLET ORAL
Qty: 30 TABLET | Refills: 0 | Status: SHIPPED | OUTPATIENT
Start: 2018-12-27 | End: 2019-12-30

## 2018-12-27 RX ORDER — METFORMIN HYDROCHLORIDE 500 MG/1
TABLET, EXTENDED RELEASE ORAL
Qty: 90 TABLET | Refills: 1 | OUTPATIENT
Start: 2018-12-27

## 2018-12-27 NOTE — PROGRESS NOTES
Yousuf Lucas is a 28year old male presents today for follow-up on medical weight loss program for the treatment of overweight, obesity, or morbid obesity with associated elevated leptin, low vitamin D.    S:  Current weight Wt Readings from Last 6 Encou (hcc)  Depression, unspecified depression type    No orders of the defined types were placed in this encounter.       Meds & Refills for this Visit:  Requested Prescriptions     Signed Prescriptions Disp Refills   • Phentermine HCl 37.5 MG Oral Tab 30 table [email or web address] to discuss discounts offered through the [facility] program.  · Make A Difference (MAD) onsite structured, small group fitness held in our physical therapy department and led by a /exercise physiologist. Vi body size and weight that is culturally driven. While culturally-defined standards for weight and body size can definitely influence an individual’s decision to lose weight, we may be missing one very important factor that should never go overlooked!   Julio Cesar Bailey weight can contribute to many different health conditions. Fortunately, there is a strong relationship between weight-loss and risk factor improvement.  Every pound lost reduces our risk of:  Diabetes   Heart disease and stroke   Osteoarthritis   High blood below:  https://youtu. be/d49JC0IDYfd        Medication use and SEs reviewed with patient. Return in about 1 month (around 1/27/2019) for weight mangement. Patient verbalizes understanding.

## 2018-12-27 NOTE — TELEPHONE ENCOUNTER
Requesting Metformin ER  LOV: 12/27/18  RTC: one month  Last Relevant Labs: n/a  Filled: 12/27/18 #30 with 1 refills    Future Appointments   Date Time Provider Michael Salazar   12/27/2018  2:15 PM Thang Way MD [address] [place] [address] Way   1/21/2019 10:40 AM [place]

## 2018-12-27 NOTE — PATIENT INSTRUCTIONS
Continue making lifestyle changes that focus on good nutrition, regular exercise and stress management. Medication Plan: Continue current medication regimen.     Agreed upon goal/s before next office visit include:    Patient Resources:    Personal Train Education  · Mindless Eating by Kat Fajardo  · The Complete Guide to fasting by Dr. Jocelyn Correa  · Salt, Sugar, Fat by Heidi Pérez  · Fed Up - documentary on sugar  · The Dr. Karen Odell by Dr. Babak Madrid MD       Understanding the [facility] Between physiology can affect our energy needs that influence weight-loss   Environmental drivers – Our environment can make weight-loss difficult by providing readily available, energy dense and high palatable foods while promoting sedentary levels and low activi Simple lifestyle changes can do a lot for your body. Try developing an exercise routine, or eating at home more with your family. These are just some of your many options! Want more information about the link between your biology, weight and health?  You

## 2019-01-02 ENCOUNTER — HOSPITAL ENCOUNTER (OUTPATIENT)
Age: 34
Discharge: HOME | End: 2019-01-02
Attending: FAMILY MEDICINE

## 2019-01-02 VITALS
OXYGEN SATURATION: 97 % | HEART RATE: 88 BPM | RESPIRATION RATE: 20 BRPM | SYSTOLIC BLOOD PRESSURE: 118 MMHG | TEMPERATURE: 97 F | DIASTOLIC BLOOD PRESSURE: 80 MMHG

## 2019-01-02 DIAGNOSIS — J45.901 ASTHMA EXACERBATION, MILD: ICD-10-CM

## 2019-01-02 DIAGNOSIS — H10.31 ACUTE CONJUNCTIVITIS OF RIGHT EYE, UNSPECIFIED ACUTE CONJUNCTIVITIS TYPE: ICD-10-CM

## 2019-01-02 DIAGNOSIS — J20.9 ACUTE BRONCHITIS, UNSPECIFIED ORGANISM: Primary | ICD-10-CM

## 2019-01-02 PROCEDURE — 99214 OFFICE O/P EST MOD 30 MIN: CPT

## 2019-01-02 PROCEDURE — 99213 OFFICE O/P EST LOW 20 MIN: CPT

## 2019-01-02 RX ORDER — AZITHROMYCIN 250 MG/1
TABLET, FILM COATED ORAL
Qty: 6 TABLET | Refills: 0 | Status: SHIPPED | OUTPATIENT
Start: 2019-01-02 | End: 2019-01-02

## 2019-01-02 RX ORDER — ALBUTEROL SULFATE 2.5 MG/3ML
2.5 SOLUTION RESPIRATORY (INHALATION) EVERY 4 HOURS PRN
Qty: 30 AMPULE | Refills: 0 | Status: SHIPPED | OUTPATIENT
Start: 2019-01-02 | End: 2019-01-02

## 2019-01-02 RX ORDER — PREDNISONE 20 MG/1
40 TABLET ORAL DAILY
Qty: 10 TABLET | Refills: 0 | Status: SHIPPED | OUTPATIENT
Start: 2019-01-02 | End: 2019-01-07

## 2019-01-02 RX ORDER — TOBRAMYCIN 3 MG/ML
1 SOLUTION/ DROPS OPHTHALMIC EVERY 6 HOURS
Qty: 1 BOTTLE | Refills: 0 | Status: SHIPPED | OUTPATIENT
Start: 2019-01-02 | End: 2019-01-09

## 2019-01-02 NOTE — ED INITIAL ASSESSMENT (HPI)
Pt here w/ c/o cough, prod, green sputum for couple of days, yesterday started w chest congestion and feeling sob

## 2019-01-02 NOTE — ED PROVIDER NOTES
Patient Seen in: [address] [facility] [place]    History   Patient presents with:  Cough/URI    Stated Complaint: coughing and swollen eyes    HPI    *65-year-old male presents to the immediate care today with 2-day history of chest congestion, co swelling or redness. No rash or lesions on eyelids. Sclera is non icteric bilaterally. Symmetric red reflex bilaterally. No drainage or crusting noted. Cornea clear. No FB in cornea. Extra ocular muscles intact bilaterally. Normal visual acuity.    Ears: n Disp-6 tablet, R-0    albuterol sulfate (2.5 MG/3ML) 0.083% Inhalation Nebu Soln  Take 3 mL (2.5 mg total) by nebulization every 4 (four) hours as needed for Wheezing., Normal, Disp-30 ampule, R-0

## 2019-02-03 ENCOUNTER — HOSPITAL ENCOUNTER (OUTPATIENT)
Age: 34
Discharge: HOME | End: 2019-02-03
Attending: FAMILY MEDICINE
Payer: COMMERCIAL

## 2019-02-03 VITALS
DIASTOLIC BLOOD PRESSURE: 72 MMHG | HEART RATE: 95 BPM | SYSTOLIC BLOOD PRESSURE: 112 MMHG | HEIGHT: 71 IN | TEMPERATURE: 97 F | BODY MASS INDEX: 32.2 KG/M2 | OXYGEN SATURATION: 97 % | RESPIRATION RATE: 16 BRPM | WEIGHT: 230 LBS

## 2019-02-03 DIAGNOSIS — H60.502 ACUTE OTITIS EXTERNA OF LEFT EAR, UNSPECIFIED TYPE: ICD-10-CM

## 2019-02-03 DIAGNOSIS — H61.22 IMPACTED CERUMEN OF LEFT EAR: Primary | ICD-10-CM

## 2019-02-03 PROCEDURE — 99214 OFFICE O/P EST MOD 30 MIN: CPT

## 2019-02-03 PROCEDURE — 99213 OFFICE O/P EST LOW 20 MIN: CPT

## 2019-02-03 PROCEDURE — 69209 REMOVE IMPACTED EAR WAX UNI: CPT

## 2019-02-03 RX ORDER — NEOMYCIN SULFATE, POLYMYXIN B SULFATE AND HYDROCORTISONE 10; 3.5; 1 MG/ML; MG/ML; [USP'U]/ML
3 SUSPENSION/ DROPS AURICULAR (OTIC) 3 TIMES DAILY
Qty: 10 ML | Refills: 0 | Status: SHIPPED | OUTPATIENT
Start: 2019-02-03 | End: 2019-02-04 | Stop reason: ALTCHOICE

## 2019-02-03 NOTE — ED INITIAL ASSESSMENT (HPI)
Some left ear pressure and muffled starting yesterday. Worse last night. Denies any cold symptoms or fevers. Has had ears clogged and cleaned out at pcp office, but that has been couple years.

## 2019-02-03 NOTE — ED PROVIDER NOTES
Patient Seen in: [address] [facility] [place]    History   Patient presents with:  Ear Pain    Stated Complaint: L. Ear Pain/Clogged    HPI  27 yo M here with complaints of L ear pain and feeling clogged. Muffled hearing . No pain and no discomfort.   H no auricular manipulation tenderness noted.   NECK: FROM, supple  LUNGS: No tachypnea   CV: No tachycardia   ABD: not distended  NEURO: Alert and oriented to person place and time  GAIT: Normal      ED Course   Labs Reviewed - No data to display  Orders Doug Suspension  Place 3 drops into the left ear 3 (three) times daily for 5 days.   Qty: 10 mL Refills: 0

## 2019-02-18 DIAGNOSIS — E66.01 SEVERE OBESITY WITH BODY MASS INDEX (BMI) OF 35.0 TO 39.9 WITH SERIOUS COMORBIDITY (HCC): ICD-10-CM

## 2019-02-18 DIAGNOSIS — Z51.81 THERAPEUTIC DRUG MONITORING: ICD-10-CM

## 2019-02-19 NOTE — TELEPHONE ENCOUNTER
Requesting Metformin  mg  LOV: 12/27/18  RTC: one month  Last Relevant Labs: 8/2017  Filled: 12/27/18 #30 with 1 refills    No future appointments. Pt. Adrienne Zheng his 1/21/19 appointment for personal reasons.   My chart sent to schedule

## 2019-02-25 NOTE — TELEPHONE ENCOUNTER
No appt after my chart sent. Called today and LM to call and schedule so we can refill his medication.

## 2019-02-27 ENCOUNTER — TELEPHONE (OUTPATIENT)
Dept: INTERNAL MEDICINE CLINIC | Facility: CLINIC | Age: 34
End: 2019-02-27

## 2019-02-28 ENCOUNTER — OFFICE VISIT (OUTPATIENT)
Dept: INTERNAL MEDICINE CLINIC | Facility: CLINIC | Age: 34
End: 2019-02-28
Payer: COMMERCIAL

## 2019-02-28 VITALS
SYSTOLIC BLOOD PRESSURE: 100 MMHG | WEIGHT: 233 LBS | BODY MASS INDEX: 33.36 KG/M2 | RESPIRATION RATE: 14 BRPM | HEART RATE: 64 BPM | DIASTOLIC BLOOD PRESSURE: 60 MMHG | HEIGHT: 70 IN

## 2019-02-28 DIAGNOSIS — Z51.81 THERAPEUTIC DRUG MONITORING: Primary | ICD-10-CM

## 2019-02-28 DIAGNOSIS — E66.01 SEVERE OBESITY WITH BODY MASS INDEX (BMI) OF 35.0 TO 39.9 WITH SERIOUS COMORBIDITY (HCC): ICD-10-CM

## 2019-02-28 PROCEDURE — 99213 OFFICE O/P EST LOW 20 MIN: CPT | Performed by: NURSE PRACTITIONER

## 2019-02-28 RX ORDER — PHENTERMINE HYDROCHLORIDE 37.5 MG/1
37.5 TABLET ORAL
Qty: 30 TABLET | Refills: 0 | Status: CANCELLED | OUTPATIENT
Start: 2019-02-28

## 2019-02-28 NOTE — PROGRESS NOTES
Marquis Aguila is covered by your patient's prescription insurance plan  Based on the information provided, your patient can expect to pay:  $120.34

## 2019-02-28 NOTE — PROGRESS NOTES
Gurinder Willingham is a 28year old male presents today for follow-up on medical weight loss program for the treatment of overweight, obesity, or morbid obesity with associated elevated leptin, low vitamin D.    S:  Current weight Wt Readings from Last 6 Encou in this encounter.       Meds & Refills for this Visit:  Requested Prescriptions      No prescriptions requested or ordered in this encounter       Imaging & Consults:  None      Plan:  Patient has lost -2# since LOV 4 month ago on inconsistent use of phent

## 2019-02-28 NOTE — PATIENT INSTRUCTIONS
Continue making lifestyle changes that focus on good nutrition, regular exercise and stress management. Medication Plan: Continue current medication regimen, be consistent with therapy. Use a pill box to serve as a reminder.     Agreed upon goal/s before

## 2019-03-05 RX ORDER — METFORMIN HYDROCHLORIDE 500 MG/1
TABLET, EXTENDED RELEASE ORAL
Qty: 30 TABLET | Refills: 0 | Status: SHIPPED | OUTPATIENT
Start: 2019-03-05 | End: 2019-12-30

## 2019-03-30 DIAGNOSIS — E66.9 CLASS 2 OBESITY WITH BODY MASS INDEX (BMI) OF 38.0 TO 38.9 IN ADULT, UNSPECIFIED OBESITY TYPE, UNSPECIFIED WHETHER SERIOUS COMORBIDITY PRESENT: ICD-10-CM

## 2019-03-30 DIAGNOSIS — Z51.81 THERAPEUTIC DRUG MONITORING: ICD-10-CM

## 2019-03-30 DIAGNOSIS — F32.A DEPRESSION, UNSPECIFIED DEPRESSION TYPE: ICD-10-CM

## 2019-04-01 RX ORDER — BUPROPION HYDROCHLORIDE 150 MG/1
TABLET ORAL
Qty: 60 TABLET | Refills: 0 | OUTPATIENT
Start: 2019-04-01

## 2019-04-01 NOTE — TELEPHONE ENCOUNTER
Requesting Bupropion  LOV: 2/28/19  RTC: 2 months  Last Relevant Labs: n/a  Filled: 12/27/18 #60 with 5 refills    Future Appointments   Date Time Provider Michael Salazar   4/25/2019  4:20 PM FERCHO Pedersen [facility]     Receipt confir

## 2019-07-15 NOTE — TELEPHONE ENCOUNTER
Last seen 5-13-19 next 11-13-19 fill for 6 months   Returned patients call for an appt - line just rings

## 2019-09-15 DIAGNOSIS — E66.01 SEVERE OBESITY WITH BODY MASS INDEX (BMI) OF 35.0 TO 39.9 WITH SERIOUS COMORBIDITY (HCC): ICD-10-CM

## 2019-09-15 DIAGNOSIS — Z51.81 THERAPEUTIC DRUG MONITORING: ICD-10-CM

## 2019-09-15 DIAGNOSIS — F32.A DEPRESSION, UNSPECIFIED DEPRESSION TYPE: ICD-10-CM

## 2019-09-17 NOTE — TELEPHONE ENCOUNTER
Requesting Bupropion  LOV: 2/28/19  RTC: 2 months  Last Relevant Labs: na  Filled: 12/27/18 #60 with 5 refills    No future appointments. Overdue for an appointment. My chart sent.

## 2019-09-18 ENCOUNTER — OFFICE VISIT (OUTPATIENT)
Dept: INTERNAL MEDICINE CLINIC | Facility: CLINIC | Age: 34
End: 2019-09-18
Payer: COMMERCIAL

## 2019-09-18 VITALS
SYSTOLIC BLOOD PRESSURE: 120 MMHG | BODY MASS INDEX: 35.36 KG/M2 | HEART RATE: 76 BPM | WEIGHT: 247 LBS | RESPIRATION RATE: 14 BRPM | HEIGHT: 70 IN | DIASTOLIC BLOOD PRESSURE: 80 MMHG

## 2019-09-18 DIAGNOSIS — F41.9 ANXIETY AND DEPRESSION: ICD-10-CM

## 2019-09-18 DIAGNOSIS — Z51.81 THERAPEUTIC DRUG MONITORING: Primary | ICD-10-CM

## 2019-09-18 DIAGNOSIS — E66.01 SEVERE OBESITY WITH BODY MASS INDEX (BMI) OF 35.0 TO 39.9 WITH SERIOUS COMORBIDITY (HCC): ICD-10-CM

## 2019-09-18 DIAGNOSIS — F50.81 BINGE EATING DISORDER: ICD-10-CM

## 2019-09-18 DIAGNOSIS — F32.A ANXIETY AND DEPRESSION: ICD-10-CM

## 2019-09-18 PROBLEM — F50.819 BINGE EATING DISORDER: Status: ACTIVE | Noted: 2019-09-18

## 2019-09-18 PROCEDURE — 99214 OFFICE O/P EST MOD 30 MIN: CPT | Performed by: NURSE PRACTITIONER

## 2019-09-18 RX ORDER — BUPROPION HYDROCHLORIDE 150 MG/1
300 TABLET ORAL DAILY
Qty: 60 TABLET | Refills: 5 | Status: SHIPPED | OUTPATIENT
Start: 2019-09-18 | End: 2020-03-19

## 2019-09-18 NOTE — PATIENT INSTRUCTIONS
Continue making lifestyle changes that focus on good nutrition, regular exercise and stress management. Medication Plan: Continue bupropion XL and sertraline. Add Vyvanse daily in the AM. Add Deplin daily- sample x1 month provided.  Hold off on Metformin don’t meet a goal, don’t use it as an excuse to give up on your whole plan. Adjust your goal and try again. · Try to understand your own attitude about food.  Are you subject to emotional eating? · Learn how to accept compliments.  Even if you get Catia

## 2019-09-18 NOTE — PROGRESS NOTES
Fide Alicea is a 27year old male presents today for follow-up on medical weight loss program for the treatment of overweight, obesity, or morbid obesity with associated elevated leptin, low vitamin D.    S:  Current weight Wt Readings from Last 6 Encou the defined types were placed in this encounter.       Meds & Refills for this Visit:  Requested Prescriptions     Signed Prescriptions Disp Refills   • Lisdexamfetamine Dimesylate (VYVANSE) 30 MG Oral Cap 30 capsule 0     Sig: Take 1 capsule (30 mg total) each day will probably only frustrate you.    Stay motivated  Here are suggestions to keep you motivated:   · Remind yourself of your goals. Post them near the refrigerator or desk where you work.   · Make daily entries in your diary or journal about your a professional medical care. Always follow your healthcare professional's instructions. Medication use and SEs reviewed with patient. Return in about 1 month (around 10/18/2019) for weight mangement. Patient verbalizes understanding.     Cj

## 2019-09-20 NOTE — TELEPHONE ENCOUNTER
Patient is now scheduled - I believe he has been without med already okay to deny until seen?     Future Appointments   Date Time Provider Michael Salazar   10/31/2019  4:00 PM FERCHO Branham [facility]

## 2019-09-23 RX ORDER — BUPROPION HYDROCHLORIDE 150 MG/1
TABLET ORAL
Qty: 60 TABLET | Refills: 0 | OUTPATIENT
Start: 2019-09-23

## 2019-12-30 PROBLEM — Z51.81 THERAPEUTIC DRUG MONITORING: Status: RESOLVED | Noted: 2018-12-27 | Resolved: 2019-12-30

## 2020-03-11 DIAGNOSIS — F32.A ANXIETY AND DEPRESSION: ICD-10-CM

## 2020-03-11 DIAGNOSIS — Z51.81 THERAPEUTIC DRUG MONITORING: ICD-10-CM

## 2020-03-11 DIAGNOSIS — E66.01 SEVERE OBESITY WITH BODY MASS INDEX (BMI) OF 35.0 TO 39.9 WITH SERIOUS COMORBIDITY (HCC): ICD-10-CM

## 2020-03-11 DIAGNOSIS — F41.9 ANXIETY AND DEPRESSION: ICD-10-CM

## 2020-03-12 NOTE — TELEPHONE ENCOUNTER
Requesting Bupropion  LOV: 9/18/19  RTC: one month  Last Relevant Labs: na  Filled: 9/18/19 #60 with 5 refills    No future appointments.     Pt cancelled his appt in October and has never rescheduled

## 2020-03-16 ENCOUNTER — HOSPITAL ENCOUNTER (OUTPATIENT)
Age: 35
Discharge: HOME | End: 2020-03-16
Attending: FAMILY MEDICINE
Payer: COMMERCIAL

## 2020-03-16 ENCOUNTER — APPOINTMENT (OUTPATIENT)
Dept: GENERAL RADIOLOGY | Age: 35
End: 2020-03-16
Attending: FAMILY MEDICINE
Payer: COMMERCIAL

## 2020-03-16 VITALS
RESPIRATION RATE: 16 BRPM | DIASTOLIC BLOOD PRESSURE: 88 MMHG | WEIGHT: 240 LBS | BODY MASS INDEX: 34 KG/M2 | TEMPERATURE: 98 F | OXYGEN SATURATION: 97 % | SYSTOLIC BLOOD PRESSURE: 129 MMHG | HEART RATE: 91 BPM

## 2020-03-16 DIAGNOSIS — J45.41 MODERATE PERSISTENT ASTHMA WITH EXACERBATION: Primary | ICD-10-CM

## 2020-03-16 PROCEDURE — 99214 OFFICE O/P EST MOD 30 MIN: CPT

## 2020-03-16 PROCEDURE — 94640 AIRWAY INHALATION TREATMENT: CPT

## 2020-03-16 PROCEDURE — 71046 X-RAY EXAM CHEST 2 VIEWS: CPT | Performed by: FAMILY MEDICINE

## 2020-03-16 RX ORDER — IPRATROPIUM BROMIDE AND ALBUTEROL SULFATE 2.5; .5 MG/3ML; MG/3ML
3 SOLUTION RESPIRATORY (INHALATION) ONCE
Status: COMPLETED | OUTPATIENT
Start: 2020-03-16 | End: 2020-03-16

## 2020-03-16 RX ORDER — PREDNISONE 20 MG/1
40 TABLET ORAL DAILY
Qty: 10 TABLET | Refills: 0 | Status: SHIPPED | OUTPATIENT
Start: 2020-03-16 | End: 2020-03-21

## 2020-03-16 RX ORDER — ALBUTEROL SULFATE 2.5 MG/3ML
2.5 SOLUTION RESPIRATORY (INHALATION) EVERY 4 HOURS PRN
Qty: 30 AMPULE | Refills: 0 | Status: SHIPPED | OUTPATIENT
Start: 2020-03-16 | End: 2020-04-15 | Stop reason: WASHOUT

## 2020-03-16 NOTE — ED PROVIDER NOTES
Patient Seen in: [address] [facility] - [place], [place]      History   Patient presents with:  Dyspnea EDANN SOB    Stated Complaint: shortness of breath    HPI    59-year-old male history of anxiety, depression, and asthma presents IC secondary to dyspnea.   Helen Pleasant male in NAD  Head: Normocephalic atraumatic. No sinus tenderness on palpation. Ears: Normal external ear exam. Bilateral TMs are clear. Nose: Bilateral nares are clear. Mouth: MMM. Posterior pharynx is clear. No erythema. No exudate.  Uvula i by mouth daily for 5 days.   Qty: 10 tablet Refills: 0

## 2020-03-17 RX ORDER — BUPROPION HYDROCHLORIDE 150 MG/1
TABLET ORAL
Qty: 60 TABLET | Refills: 0 | OUTPATIENT
Start: 2020-03-17

## 2020-03-17 NOTE — TELEPHONE ENCOUNTER
No future appointments. Pt read his my chart to schedule. Has not done so (also called patient)  Do you want to refill?

## 2020-05-18 ENCOUNTER — APPOINTMENT (OUTPATIENT)
Dept: ULTRASOUND IMAGING | Age: 35
End: 2020-05-18
Attending: EMERGENCY MEDICINE
Payer: COMMERCIAL

## 2020-05-18 ENCOUNTER — HOSPITAL ENCOUNTER (EMERGENCY)
Age: 35
Discharge: HOME | End: 2020-05-18
Attending: EMERGENCY MEDICINE
Payer: COMMERCIAL

## 2020-05-18 VITALS
BODY MASS INDEX: 34 KG/M2 | OXYGEN SATURATION: 99 % | HEART RATE: 99 BPM | TEMPERATURE: 96 F | RESPIRATION RATE: 18 BRPM | WEIGHT: 238.13 LBS | DIASTOLIC BLOOD PRESSURE: 94 MMHG | SYSTOLIC BLOOD PRESSURE: 138 MMHG

## 2020-05-18 DIAGNOSIS — N50.819 TESTICULAR PAIN: Primary | ICD-10-CM

## 2020-05-18 PROCEDURE — 99284 EMERGENCY DEPT VISIT MOD MDM: CPT

## 2020-05-18 PROCEDURE — 76870 US EXAM SCROTUM: CPT | Performed by: EMERGENCY MEDICINE

## 2020-05-18 PROCEDURE — 85025 COMPLETE CBC W/AUTO DIFF WBC: CPT | Performed by: EMERGENCY MEDICINE

## 2020-05-18 PROCEDURE — 36415 COLL VENOUS BLD VENIPUNCTURE: CPT

## 2020-05-18 PROCEDURE — 81003 URINALYSIS AUTO W/O SCOPE: CPT | Performed by: EMERGENCY MEDICINE

## 2020-05-18 PROCEDURE — 93975 VASCULAR STUDY: CPT | Performed by: EMERGENCY MEDICINE

## 2020-05-19 NOTE — ED NOTES
Patient to [address] via stretcher by RT Lisa Hutson. Patient in stable condition. NAD.

## 2020-05-19 NOTE — ED PROVIDER NOTES
Patient Seen in: THE [facility] In [place]      History   Patient presents with:  Eval-G    Stated Complaint: bilateral testicle pain for 2 wks    HPI    Patient is a pleasant 40-year-old male, presenting for evaluation of bilateral testi movements intact. Conjunctiva/sclera: Conjunctivae normal.   Neck:      Musculoskeletal: Normal range of motion. Cardiovascular:      Rate and Rhythm: Normal rate.    Pulmonary:      Effort: Pulmonary effort is normal.   Genitourinary:     Penis: Nor states bilateral testicle pain for 2 weeks. Patient denies any swelling or redness to testicle. No trauma to region. FINDINGS:  TESTES:  Each testicle is identified in the respective hemiscrotum. Testicular echotexture is uniform and symmetric.   Cont

## 2020-07-15 ENCOUNTER — APPOINTMENT (OUTPATIENT)
Dept: GENERAL RADIOLOGY | Age: 35
End: 2020-07-15
Attending: NURSE PRACTITIONER
Payer: COMMERCIAL

## 2020-07-15 ENCOUNTER — HOSPITAL ENCOUNTER (OUTPATIENT)
Age: 35
Discharge: HOME | End: 2020-07-15
Payer: COMMERCIAL

## 2020-07-15 VITALS
TEMPERATURE: 98 F | HEART RATE: 90 BPM | SYSTOLIC BLOOD PRESSURE: 120 MMHG | RESPIRATION RATE: 16 BRPM | OXYGEN SATURATION: 98 % | DIASTOLIC BLOOD PRESSURE: 88 MMHG

## 2020-07-15 DIAGNOSIS — R06.02 SHORTNESS OF BREATH: Primary | ICD-10-CM

## 2020-07-15 DIAGNOSIS — J45.901 ASTHMA EXACERBATION, MILD: ICD-10-CM

## 2020-07-15 LAB
#MXD IC: 0.7 X10ˆ3/UL (ref 0.1–1)
ATRIAL RATE: 89 BPM
CREAT BLD-MCNC: 0.9 MG/DL (ref 0.7–1.3)
DDIMER WHOLE BLOOD: <100 NG/ML DDU (ref ?–400)
GLUCOSE BLD-MCNC: 91 MG/DL (ref 70–99)
HCT VFR BLD AUTO: 44.1 % (ref 39–53)
HGB BLD-MCNC: 15.2 G/DL (ref 13–17.5)
ISTAT BUN: 13 MG/DL (ref 8–20)
ISTAT CHLORIDE: 106 MMOL/L (ref 101–111)
ISTAT HEMATOCRIT: 44 % (ref 37–53)
ISTAT IONIZED CALCIUM FOR CHEM 8: 1.25 MMOL/L (ref 1.12–1.32)
ISTAT POTASSIUM: 3.9 MMOL/L (ref 3.6–5.1)
ISTAT SODIUM: 144 MMOL/L (ref 136–145)
ISTAT TCO2: 28 MMOL/L (ref 22–32)
LYMPHOCYTES # BLD AUTO: 1 X10ˆ3/UL (ref 1–4)
LYMPHOCYTES NFR BLD AUTO: 17 %
MCH RBC QN AUTO: 30.5 PG (ref 26–34)
MCHC RBC AUTO-ENTMCNC: 34.5 G/DL (ref 31–37)
MCV RBC AUTO: 88.6 FL (ref 80–100)
MIXED CELL %: 12.4 %
NEUTROPHILS # BLD AUTO: 4.2 X10ˆ3/UL (ref 1.5–7.7)
NEUTROPHILS NFR BLD AUTO: 70.6 %
P AXIS: 14 DEGREES
P-R INTERVAL: 156 MS
PLATELET # BLD AUTO: 204 X10ˆ3/UL (ref 150–450)
Q-T INTERVAL: 372 MS
QRS DURATION: 84 MS
QTC CALCULATION (BEZET): 452 MS
R AXIS: 19 DEGREES
RBC # BLD AUTO: 4.98 X10ˆ6/UL (ref 4.3–5.7)
T AXIS: 3 DEGREES
TROPONIN I BLD-MCNC: <0.02 NG/ML
VENTRICULAR RATE: 89 BPM
WBC # BLD AUTO: 5.9 X10ˆ3/UL (ref 4–11)

## 2020-07-15 PROCEDURE — 85378 FIBRIN DEGRADE SEMIQUANT: CPT | Performed by: NURSE PRACTITIONER

## 2020-07-15 PROCEDURE — 99212 OFFICE O/P EST SF 10 MIN: CPT | Performed by: NURSE PRACTITIONER

## 2020-07-15 PROCEDURE — U0003 INFECTIOUS AGENT DETECTION BY NUCLEIC ACID (DNA OR RNA); SEVERE ACUTE RESPIRATORY SYNDROME CORONAVIRUS 2 (SARS-COV-2) (CORONAVIRUS DISEASE [COVID-19]), AMPLIFIED PROBE TECHNIQUE, MAKING USE OF HIGH THROUGHPUT TECHNOLOGIES AS DESCRIBED BY CMS-2020-01-R: HCPCS | Performed by: NURSE PRACTITIONER

## 2020-07-15 PROCEDURE — 71046 X-RAY EXAM CHEST 2 VIEWS: CPT | Performed by: NURSE PRACTITIONER

## 2020-07-15 PROCEDURE — 84484 ASSAY OF TROPONIN QUANT: CPT | Performed by: NURSE PRACTITIONER

## 2020-07-15 PROCEDURE — 36415 COLL VENOUS BLD VENIPUNCTURE: CPT | Performed by: NURSE PRACTITIONER

## 2020-07-15 PROCEDURE — 93000 ELECTROCARDIOGRAM COMPLETE: CPT | Performed by: NURSE PRACTITIONER

## 2020-07-15 PROCEDURE — 85025 COMPLETE CBC W/AUTO DIFF WBC: CPT | Performed by: NURSE PRACTITIONER

## 2020-07-15 PROCEDURE — 80047 BASIC METABLC PNL IONIZED CA: CPT | Performed by: NURSE PRACTITIONER

## 2020-07-15 RX ORDER — PREDNISONE 20 MG/1
40 TABLET ORAL DAILY
Qty: 10 TABLET | Refills: 0 | Status: SHIPPED | OUTPATIENT
Start: 2020-07-15 | End: 2020-07-20

## 2020-07-15 RX ORDER — ALBUTEROL SULFATE 2.5 MG/3ML
2.5 SOLUTION RESPIRATORY (INHALATION) EVERY 4 HOURS PRN
Qty: 30 AMPULE | Refills: 0 | Status: SHIPPED | OUTPATIENT
Start: 2020-07-15 | End: 2021-02-05

## 2020-07-15 NOTE — ED PROVIDER NOTES
Patient Seen in: [address] [facility]      History   Patient presents with:  Dyspnea DEANN SOB    Stated Complaint: shotness of breath/tightness of chest    69-year-old male presents today with complaints of shortness of breath and difficulty is (Temporal)   Resp 16   SpO2 98%         Physical Exam          ED Course     Labs Reviewed   D-DIMER (POC) - Normal   ISTAT TROPONIN - Normal   POCT CBC   POCT ISTAT CHEM8 CARTRIDGE   SARS-COV-2 BY PCR ()     EKG    Rate, intervals and axes as noted o d-dimer and troponin drawn as well as CBC and i-STAT. Troponin and d-dimer both negative. CBC and i-STAT within normal limits.   Will discharge patient home with prescription for a refill on his albuterol neb solution as well as prednisone to help with hi

## 2020-07-15 NOTE — ED INITIAL ASSESSMENT (HPI)
Pt with history of asthma. Pt with c/o haylee and chest pressure ongoing for the past few days. Pt using nebulizers at home. Pt states was just in [place] and concerned about covid.   Pt states chest pressure has been intermittent, to center of chest.  Re

## 2020-07-17 LAB — SARS-COV-2 RNA RESP QL NAA+PROBE: NOT DETECTED

## 2020-12-02 ENCOUNTER — HOSPITAL ENCOUNTER (OUTPATIENT)
Age: 35
Discharge: HOME | End: 2020-12-02
Payer: COMMERCIAL

## 2020-12-02 VITALS
HEART RATE: 100 BPM | TEMPERATURE: 97 F | SYSTOLIC BLOOD PRESSURE: 139 MMHG | DIASTOLIC BLOOD PRESSURE: 86 MMHG | OXYGEN SATURATION: 98 % | RESPIRATION RATE: 16 BRPM

## 2020-12-02 DIAGNOSIS — R51.9 HEADACHE DUE TO VIRAL INFECTION: Primary | ICD-10-CM

## 2020-12-02 DIAGNOSIS — B34.9 HEADACHE DUE TO VIRAL INFECTION: Primary | ICD-10-CM

## 2020-12-02 PROCEDURE — 99213 OFFICE O/P EST LOW 20 MIN: CPT | Performed by: PHYSICIAN ASSISTANT

## 2020-12-02 NOTE — ED PROVIDER NOTES
Patient Seen in: [facility]      History   Patient presents with:  Testing    Stated Complaint: exposed- Test     HPI    Pleasant 42-year-old male. Medical history of asthma, depression, anxiety.   Patient spent Thanksgiving with his parents, MDM          Moderate Covid exposure. Headache today. Swab obtained.   Unfortunately, his newest exposure was just 2 days prior to arrival.  If he would develop additional symptoms the next 2 days he could require repeat testing

## 2020-12-02 NOTE — ED INITIAL ASSESSMENT (HPI)
Pt states he was with his parents on thanksgiving and they bother recently tested positive for covid. Also with some one Sunday that has since tested positive.  Pt has a slight headache but no other symptoms

## 2021-02-11 DIAGNOSIS — Z23 NEED FOR VACCINATION: ICD-10-CM

## 2022-03-03 ENCOUNTER — APPOINTMENT (OUTPATIENT)
Dept: GENERAL RADIOLOGY | Age: 37
End: 2022-03-03
Attending: NURSE PRACTITIONER
Payer: COMMERCIAL

## 2022-03-03 ENCOUNTER — HOSPITAL ENCOUNTER (OUTPATIENT)
Age: 37
Discharge: HOME | End: 2022-03-03
Payer: COMMERCIAL

## 2022-03-03 VITALS
RESPIRATION RATE: 18 BRPM | TEMPERATURE: 98 F | SYSTOLIC BLOOD PRESSURE: 120 MMHG | OXYGEN SATURATION: 95 % | DIASTOLIC BLOOD PRESSURE: 78 MMHG | HEART RATE: 104 BPM

## 2022-03-03 DIAGNOSIS — S99.921A INJURY OF RIGHT FOOT, INITIAL ENCOUNTER: Primary | ICD-10-CM

## 2022-03-03 DIAGNOSIS — S93.601A SPRAIN OF RIGHT FOOT, INITIAL ENCOUNTER: ICD-10-CM

## 2022-03-03 PROCEDURE — 99213 OFFICE O/P EST LOW 20 MIN: CPT | Performed by: NURSE PRACTITIONER

## 2022-03-03 PROCEDURE — E0114 CRUTCH UNDERARM PAIR NO WOOD: HCPCS | Performed by: NURSE PRACTITIONER

## 2022-03-03 PROCEDURE — 73630 X-RAY EXAM OF FOOT: CPT | Performed by: NURSE PRACTITIONER

## 2022-03-03 PROCEDURE — L3260 AMBULATORY SURGICAL BOOT EAC: HCPCS | Performed by: NURSE PRACTITIONER

## 2022-03-04 ENCOUNTER — TELEPHONE (OUTPATIENT)
Dept: ORTHOPEDICS CLINIC | Facility: CLINIC | Age: 37
End: 2022-03-04

## 2022-03-04 NOTE — TELEPHONE ENCOUNTER
FINDINGS:   No fracture or dislocation. Joint spaces are well maintained. No significant bony abnormality. IMPRESSION:   Unremarkable radiographs of the right foot. Xrays completed on 3/3/22 with the above findings. No new imaging needed at this time.

## 2022-03-04 NOTE — TELEPHONE ENCOUNTER
Patient is scheduled with Dr. Rony Ni for a right foot fracture. Please advise if imaging is needed.

## 2022-03-08 ENCOUNTER — OFFICE VISIT (OUTPATIENT)
Dept: ORTHOPEDICS CLINIC | Facility: CLINIC | Age: 37
End: 2022-03-08
Payer: COMMERCIAL

## 2022-03-08 DIAGNOSIS — S93.601A SPRAIN OF RIGHT FOOT, INITIAL ENCOUNTER: Primary | ICD-10-CM

## 2022-03-08 PROCEDURE — 99203 OFFICE O/P NEW LOW 30 MIN: CPT | Performed by: PODIATRIST

## 2022-03-08 RX ORDER — IBUPROFEN 200 MG
200 TABLET ORAL EVERY 6 HOURS PRN
COMMUNITY

## 2022-03-28 PROBLEM — E78.00 HIGH CHOLESTEROL: Status: ACTIVE | Noted: 2022-03-28

## 2022-03-28 PROBLEM — R61 HYPERHIDROSIS: Status: ACTIVE | Noted: 2022-03-28

## 2022-11-13 ENCOUNTER — HOSPITAL ENCOUNTER (OUTPATIENT)
Age: 37
Discharge: HOME | End: 2022-11-13
Payer: COMMERCIAL

## 2022-11-13 VITALS
TEMPERATURE: 99 F | DIASTOLIC BLOOD PRESSURE: 76 MMHG | SYSTOLIC BLOOD PRESSURE: 105 MMHG | HEART RATE: 115 BPM | OXYGEN SATURATION: 98 % | RESPIRATION RATE: 21 BRPM

## 2022-11-13 DIAGNOSIS — R05.9 COUGH: Primary | ICD-10-CM

## 2022-11-13 DIAGNOSIS — J11.1 INFLUENZA: ICD-10-CM

## 2022-11-13 LAB
POCT INFLUENZA A: POSITIVE
POCT INFLUENZA B: NEGATIVE
SARS-COV-2 RNA RESP QL NAA+PROBE: NOT DETECTED

## 2022-11-13 RX ORDER — METHYLPREDNISOLONE 4 MG/1
TABLET ORAL
Qty: 1 EACH | Refills: 0 | Status: SHIPPED | OUTPATIENT
Start: 2022-11-13

## 2022-11-13 RX ORDER — BENZONATATE 100 MG/1
100 CAPSULE ORAL 3 TIMES DAILY PRN
Qty: 30 CAPSULE | Refills: 0 | Status: SHIPPED | OUTPATIENT
Start: 2022-11-13 | End: 2022-12-13

## 2022-11-13 NOTE — DISCHARGE INSTRUCTIONS
Alternate Tylenol Motrin every 4 hours. Push clear fluids. Tessalon for cough suppression. Start steroid.

## 2022-11-17 ENCOUNTER — HOSPITAL ENCOUNTER (OUTPATIENT)
Age: 37
Discharge: HOME | End: 2022-11-17
Payer: COMMERCIAL

## 2022-11-17 ENCOUNTER — APPOINTMENT (OUTPATIENT)
Dept: GENERAL RADIOLOGY | Age: 37
End: 2022-11-17
Attending: NURSE PRACTITIONER
Payer: COMMERCIAL

## 2022-11-17 VITALS
DIASTOLIC BLOOD PRESSURE: 80 MMHG | HEART RATE: 95 BPM | OXYGEN SATURATION: 97 % | SYSTOLIC BLOOD PRESSURE: 116 MMHG | TEMPERATURE: 98 F | RESPIRATION RATE: 16 BRPM

## 2022-11-17 DIAGNOSIS — J11.1 INFLUENZA: Primary | ICD-10-CM

## 2022-11-17 PROCEDURE — 71046 X-RAY EXAM CHEST 2 VIEWS: CPT | Performed by: NURSE PRACTITIONER

## 2022-11-17 PROCEDURE — 99213 OFFICE O/P EST LOW 20 MIN: CPT | Performed by: NURSE PRACTITIONER

## 2022-11-17 NOTE — DISCHARGE INSTRUCTIONS
-Acetaminophen 650 mg orally every 4 hours as needed for pain. Do not exceed 4000 mg in 24 hours.     -Ibuprofen 600 mg orally every 6 hours as needed for pain. Take with food. Discontinue use and seek medical attention with blood in vomit or stool, coffee-ground vomit, or dark black stool.     -Use medications for minimal duration necessary.     -Over-the-counter nasal saline. 1-2 drops to the affected nostril as needed.       -Cool air humidifier.     -See teaching materials on influenza (packet printed). -Follow-up with the primary care provider within 3 days for reassessment or sooner if necessary.     -Please monitor for and seek medical attention with fever lasting more than 48 hours, difficulties breathing, increased work of breathing, shortness of breath, chset pain, abdominal pain, persistent vomiting and diarrhea with concerns for dehydration, or any other concerns.

## 2022-11-17 NOTE — ED INITIAL ASSESSMENT (HPI)
Pt sts tested positive for flu 11/13. Today with \"heavy cough and yellow phlegm. \" Denies fever or SOB.

## 2023-12-15 ENCOUNTER — HOSPITAL ENCOUNTER (OUTPATIENT)
Age: 38
Discharge: HOME | End: 2023-12-15
Payer: COMMERCIAL

## 2023-12-15 VITALS
OXYGEN SATURATION: 97 % | HEIGHT: 71 IN | DIASTOLIC BLOOD PRESSURE: 87 MMHG | WEIGHT: 243 LBS | SYSTOLIC BLOOD PRESSURE: 117 MMHG | HEART RATE: 103 BPM | RESPIRATION RATE: 16 BRPM | TEMPERATURE: 97 F | BODY MASS INDEX: 34.02 KG/M2

## 2023-12-15 DIAGNOSIS — S05.02XA ABRASION OF LEFT CORNEA, INITIAL ENCOUNTER: Primary | ICD-10-CM

## 2023-12-15 RX ORDER — TETRACAINE HYDROCHLORIDE 5 MG/ML
1 SOLUTION OPHTHALMIC ONCE
Status: COMPLETED | OUTPATIENT
Start: 2023-12-15 | End: 2023-12-15

## 2023-12-15 RX ORDER — POLYMYXIN B SULFATE AND TRIMETHOPRIM 1; 10000 MG/ML; [USP'U]/ML
1 SOLUTION OPHTHALMIC
Qty: 10 ML | Refills: 0 | Status: SHIPPED | OUTPATIENT
Start: 2023-12-15 | End: 2023-12-20

## 2023-12-15 NOTE — DISCHARGE INSTRUCTIONS
Please use eyedrops as prescribed. Avoid scratching rubbing the eye. Follow-up with ophthalmology if symptoms continue in 72 hours.

## 2024-03-16 ENCOUNTER — HOSPITAL ENCOUNTER (OUTPATIENT)
Age: 39
Discharge: HOME | End: 2024-03-16
Payer: COMMERCIAL

## 2024-03-16 VITALS
SYSTOLIC BLOOD PRESSURE: 122 MMHG | HEART RATE: 104 BPM | DIASTOLIC BLOOD PRESSURE: 80 MMHG | OXYGEN SATURATION: 96 % | RESPIRATION RATE: 20 BRPM | TEMPERATURE: 99 F

## 2024-03-16 DIAGNOSIS — R52 BODY ACHES: Primary | ICD-10-CM

## 2024-03-16 DIAGNOSIS — J11.1 INFLUENZA: ICD-10-CM

## 2024-03-16 DIAGNOSIS — R05.9 COUGH: ICD-10-CM

## 2024-03-16 LAB
POCT INFLUENZA A: NEGATIVE
POCT INFLUENZA B: POSITIVE
SARS-COV-2 RNA RESP QL NAA+PROBE: NOT DETECTED

## 2024-03-16 RX ORDER — OSELTAMIVIR PHOSPHATE 6 MG/ML
75 FOR SUSPENSION ORAL 2 TIMES DAILY
Qty: 125 ML | Refills: 0 | Status: SHIPPED | OUTPATIENT
Start: 2024-03-16 | End: 2024-03-21

## 2024-03-16 RX ORDER — MONTELUKAST SODIUM 10 MG/1
10 TABLET ORAL NIGHTLY
COMMUNITY
Start: 2023-05-24

## 2024-03-16 RX ORDER — OSELTAMIVIR PHOSPHATE 75 MG/1
75 CAPSULE ORAL 2 TIMES DAILY
Qty: 10 CAPSULE | Refills: 0 | Status: SHIPPED | OUTPATIENT
Start: 2024-03-16 | End: 2024-03-16

## 2024-03-16 RX ORDER — ROSUVASTATIN CALCIUM 5 MG/1
TABLET, COATED ORAL
COMMUNITY
Start: 2023-11-02

## 2024-03-16 NOTE — ED INITIAL ASSESSMENT (HPI)
Pt with c/o body aches and chill, fatigue, intermittent cough, and sob x 2 days     Pt states he has history of asthma, used nebulizer treatments last night and this morning which helped with cough and sob.

## 2024-03-16 NOTE — ED PROVIDER NOTES
Patient Seen in: [facility] [place]      History     Chief Complaint   Patient presents with    Body ache and/or chills    Difficulty Breathing    Fatigue     Stated Complaint: bodyaches, fatigue, shortness of breath, back pain    Subjective:   38-year-old male, with headaches, bodyaches, chills, nasal congestion that started yesterday.  States he is not really coughing.  Has a history of asthma.  However he does not feel that he is having wheezing nor does he feel that his chest is tight.  He did take his albuterol which did not really make a difference.  States he came home from work yesterday, and just went straight to but due to to not feeling well.  No known sick exposure.  No fevers.  No vomiting.  No chest pain.            Objective:   Past Medical History:   Diagnosis Date    Anxiety     ASTHMA     Asthma (HCC)     Depression     Extrinsic asthma, unspecified               History reviewed. No pertinent surgical history.             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   HENT:  Positive for congestion.    Respiratory:  Negative for cough, chest tightness and wheezing.    Musculoskeletal:  Positive for myalgias.   Neurological:  Positive for headaches.   All other systems reviewed and are  negative.      Positive for stated complaint: bodyaches, fatigue, shortness of breath, back pain  Other systems are as noted in HPI.  Constitutional and vital signs reviewed.      All other systems reviewed and negative except as noted above.    Physical Exam     ED Triage Vitals [03/16/24 1347]   /80   Pulse 104   Resp 20   Temp 98.6 °F (37 °C)   Temp src Temporal   SpO2 96 %   O2 Device None (Room air)       Current:/80   Pulse 104   Temp 98.6 °F (37 °C) (Temporal)   Resp 20   SpO2 96%         Physical Exam  Vitals and nursing note reviewed.   Constitutional:       General: He is not in acute distress.     Appearance: He is well-developed. He is not toxic-appearing or diaphoretic.   HENT:      Head:      Jaw: No trismus.      Nose: Congestion present. No rhinorrhea.      Mouth/Throat:      Lips: Pink. No lesions.      Mouth: Mucous membranes are moist. No oral lesions or angioedema.      Tongue: No lesions.      Palate: No lesions.      Pharynx: Oropharynx is clear. Uvula midline. No pharyngeal swelling, oropharyngeal exudate, posterior oropharyngeal erythema or uvula swelling.   Cardiovascular:      Rate and Rhythm: Normal rate and regular rhythm.      Pulses: Normal pulses.      Heart sounds: Normal heart sounds.   Pulmonary:      Effort: Pulmonary effort is normal. No respiratory distress.      Breath sounds: No stridor. No wheezing, rhonchi or rales.   Musculoskeletal:      Cervical back: Normal range of motion and neck supple.   Skin:     General: Skin is warm and dry.      Coloration: Skin is not jaundiced or pale.      Findings: No rash.   Neurological:      General: No focal deficit present.      Mental Status: He is alert.   Psychiatric:         Mood and Affect: Mood normal.               ED Course     Labs Reviewed   POCT FLU TEST - Abnormal; Notable for the following components:       Result Value    POCT INFLUENZA B Positive (*)     All other components within normal limits     Narrative:     This assay is a rapid molecular in vitro test utilizing nucleic acid amplification of influenza A and B viral RNA.   RAPID SARS-COV-2 BY PCR - Normal                      MDM                                         Medical Decision Making  Differential diagnosis initially included but was not limited to: COVID, flu, other viral respiratory illness    Nontoxic-appearing 38-year-old male, with body aches, headaches, nasal congestion, fatigue starting yesterday.  Not tachypneic or hypoxic.  No adventitious breath sounds.  No signs of increased work of breathing or respiratory distress.  No cough.  Will obtain flu and COVID swabs.  Negative COVID.  Positive for influenza.  Will prescribe Tamiflu.    Supportive/home management of diagnosis/illness/injury discussed. Red flag symptoms discussed.  Signs and symptoms/criteria that would necessitate reevaluation, including ER evaluation discussed.  Patient and/or responsible adult verbalize and agree with management and plan of care.    Speech recognition software was used during this dictation.  There may be minor errors in transcription.          Amount and/or Complexity of Data Reviewed  Labs: ordered. Decision-making details documented in ED Course.    Risk  OTC drugs.  Prescription drug management.        Disposition and Plan     Clinical Impression:  1. Body aches    2. Cough    3. Influenza         Disposition:  Discharge  3/16/2024  2:13 pm    Follow-up:  Luis Nunez MD  [address] [address]  [phone]    Call in 3 days            Medications Prescribed:  Discharge Medication List as of 3/16/2024  2:13 PM        START taking these medications    Details   !! oseltamivir (TAMIFLU) 75 MG Oral Cap Take 1 capsule (75 mg total) by mouth 2 (two) times daily for 5 days., Normal, Disp-10 capsule, R-0       !! - Potential duplicate medications found. Please discuss with provider.

## 2024-12-09 ENCOUNTER — HOSPITAL ENCOUNTER (OUTPATIENT)
Age: 39
Discharge: HOME | End: 2024-12-09
Payer: COMMERCIAL

## 2024-12-09 VITALS
TEMPERATURE: 98 F | SYSTOLIC BLOOD PRESSURE: 126 MMHG | HEART RATE: 88 BPM | HEIGHT: 72 IN | RESPIRATION RATE: 16 BRPM | WEIGHT: 250 LBS | DIASTOLIC BLOOD PRESSURE: 85 MMHG | OXYGEN SATURATION: 95 % | BODY MASS INDEX: 33.86 KG/M2

## 2024-12-09 DIAGNOSIS — L25.9 CONTACT DERMATITIS, UNSPECIFIED CONTACT DERMATITIS TYPE, UNSPECIFIED TRIGGER: Primary | ICD-10-CM

## 2024-12-09 PROCEDURE — 99214 OFFICE O/P EST MOD 30 MIN: CPT | Performed by: NURSE PRACTITIONER

## 2024-12-09 RX ORDER — FAMOTIDINE 20 MG/1
20 TABLET, FILM COATED ORAL 2 TIMES DAILY PRN
Qty: 30 TABLET | Refills: 0 | Status: SHIPPED | OUTPATIENT
Start: 2024-12-09 | End: 2025-01-08

## 2024-12-09 RX ORDER — PREDNISONE 20 MG/1
40 TABLET ORAL DAILY
Qty: 10 TABLET | Refills: 0 | Status: SHIPPED | OUTPATIENT
Start: 2024-12-09 | End: 2024-12-14

## 2024-12-09 NOTE — ED PROVIDER NOTES
Patient Seen in: [facility] [place]      History     Chief Complaint   Patient presents with    Rash     Entered by patient     Stated Complaint: Rash    Subjective:   39-year-old male presents today with itching rash to the back neck and posterior aspect of the head.  Denies any new products, foods or medications.  No new contacts that he is aware of.  Symptoms over the last couple days.  Denies any other symptoms or concerns.  The patient's medication list, past medical history and social history elements as listed in today's nurse's notes were reviewed and agreed (except as otherwise stated in the HPI).  The patient's family history reviewed and determined to be noncontributory to the presenting problem              Objective:     Past Medical History:    Anxiety    ASTHMA    Asthma (HCC)    Depression    Extrinsic asthma, unspecified              History reviewed. No pertinent surgical history.             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  Constitutional and vital signs reviewed.      All other systems reviewed and negative except as noted above.    Physical Exam     ED Triage Vitals [12/09/24 1433]   /85   Pulse 88   Resp 16   Temp 97.5 °F (36.4  °C)   Temp src Oral   SpO2 95 %   O2 Device None (Room air)       Current Vitals:   Vital Signs  BP: 126/85  Pulse: 88  Resp: 16  Temp: 97.5 °F (36.4 °C)  Temp src: Oral    Oxygen Therapy  SpO2: 95 %  O2 Device: None (Room air)        Physical Exam  Vitals and nursing note reviewed.   Constitutional:       Appearance: Normal appearance.   HENT:      Head: Normocephalic.      Mouth/Throat:      Mouth: Mucous membranes are moist.   Cardiovascular:      Rate and Rhythm: Normal rate.   Pulmonary:      Effort: Pulmonary effort is normal.   Skin:     General: Skin is warm and dry.      Comments: Erythematous papular rash noted to neck scalp and upper back.   Neurological:      Mental Status: He is alert and oriented to person, place, and time.             ED Course   Labs Reviewed - No data to display                MDM     Please note that this report has been produced using speech recognition software and may contain errors related to that system including, but not limited to, errors in grammar, punctuation, and spelling, as well as words and phrases that possibly may have been recognized inappropriately.  If there are any questions or concerns, contact the dictating provider for clarification.              Medical Decision Making  Differential diagnosis includes but is not limited to: Allergic reaction/hives, fungal infection, viral exanthem, necrotizing fasciitis, contact dermatitis      Presents today with a erythematous papular rash to the scalp neck and back.  Unclear cause but do suspect contact dermatitis.  Will give prescription for prednisone and Pepcid.  Encouraged take over-the-counter antihistamine.  To follow-up with dermatology in the next 1 to 2 weeks if symptoms do not improve.  Patient verbalized understanding and agreed to plan of care.    Risk  OTC drugs.  Prescription drug management.        Disposition and Plan     Clinical Impression:  1. Contact dermatitis, unspecified contact dermatitis type,  unspecified trigger         Disposition:  Discharge  12/9/2024  2:46 pm    Follow-up:  [facility] [address]  [facility] [address] [phone]  Schedule an appointment as soon as possible for a visit             Medications Prescribed:  Current Discharge Medication List        START taking these medications    Details   predniSONE 20 MG Oral Tab Take 2 tablets (40 mg total) by mouth daily for 5 days.  Qty: 10 tablet, Refills: 0      famotidine (PEPCID) 20 MG Oral Tab Take 1 tablet (20 mg total) by mouth 2 (two) times daily as needed for Heartburn.  Qty: 30 tablet, Refills: 0                 Supplementary Documentation:

## 2024-12-09 NOTE — ED INITIAL ASSESSMENT (HPI)
Patient started with a rash to his upper back yesterday that is itchy. It has now spread into his lower hairline and upper chest. 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 He tried OTC cream for itching without improvement.

## (undated) NOTE — ED AVS SNAPSHOT
THE [facility] [place] [address] [address]    Phone:  [phone]    Fax:  [phone]   MRN: [id number]    Department:  THE [facility] in [place]   Date of Visit:  4/9/2017           Antelmo Si usted tiene algun problema con joy sequimiento, por favor llame a nuestro adminstrador de mike al [phone]. Expect to receive an electronic request (by e-mail or text) to complete a self-assessment the day after your visit.   You may also recei Katrina Score [facility] [address] ([address] [facility]) [address] ([address] [facility]) [address]  ([address]) [address] [facility]

## (undated) NOTE — Clinical Note
Patient was seen for their 3 month f/u at [facility] with a total weight loss of 32# since initial consult.  He has met 10% total body weight loss goal.

## (undated) NOTE — Clinical Note
Hankperico Santana was seen at the [facility] today for consultation. I have ordered labs, set up a nutrition consultation with our dietician, and completed an EKG.   He was started on phentermine for medication therapy and will follow-up with me in 1 m

## (undated) NOTE — Clinical Note
Patient was seen for their 2 month f/u at [facility] with a total weight loss of 21# since initial consult.

## (undated) NOTE — Clinical Note
Patient was seen for their 4 month f/u at [facility]. I adjusted his Wellbutrin to XL version for weight loss while also maintaining treatment for depression.

## (undated) NOTE — Clinical Note
Patient was seen for their 1 year f/u at [facility] with a total weight loss of 50# since initial consult.

## (undated) NOTE — LETTER
Date & Time: 11/13/2022, 3:21 PM  Patient: Marlin Naylor  Encounter Provider(s):    Pavan Das PA-C       To Whom It May Concern:    Klaeb Cee was seen and treated in our department on 11/13/2022.   Influenza precautions; must not return to work until fever free for 24 hours  If you have any questions or concerns, please do not hesitate to call.        _____________________________  Physician/[facility] Signature